# Patient Record
Sex: MALE | Race: BLACK OR AFRICAN AMERICAN | Employment: OTHER | URBAN - METROPOLITAN AREA
[De-identification: names, ages, dates, MRNs, and addresses within clinical notes are randomized per-mention and may not be internally consistent; named-entity substitution may affect disease eponyms.]

---

## 2018-07-17 ENCOUNTER — HOSPITAL ENCOUNTER (EMERGENCY)
Age: 38
Discharge: HOME | End: 2018-07-17
Attending: EMERGENCY MEDICINE
Payer: MEDICARE

## 2018-07-17 VITALS
WEIGHT: 315 LBS | TEMPERATURE: 98.7 F | OXYGEN SATURATION: 99 % | DIASTOLIC BLOOD PRESSURE: 106 MMHG | BODY MASS INDEX: 39.17 KG/M2 | HEIGHT: 75 IN | HEART RATE: 76 BPM | SYSTOLIC BLOOD PRESSURE: 151 MMHG | RESPIRATION RATE: 16 BRPM

## 2018-07-17 DIAGNOSIS — G51.0 BELL'S PALSY: Primary | ICD-10-CM

## 2018-07-17 LAB — GLUCOSE BLD-MCNC: 101 MG/DL (ref 75–110)

## 2018-07-17 PROCEDURE — 99284 EMERGENCY DEPT VISIT MOD MDM: CPT

## 2018-07-17 PROCEDURE — 6370000000 HC RX 637 (ALT 250 FOR IP): Performed by: EMERGENCY MEDICINE

## 2018-07-17 PROCEDURE — 82947 ASSAY GLUCOSE BLOOD QUANT: CPT

## 2018-07-17 RX ORDER — PREDNISONE 20 MG/1
60 TABLET ORAL ONCE
Status: COMPLETED | OUTPATIENT
Start: 2018-07-17 | End: 2018-07-17

## 2018-07-17 RX ORDER — PREDNISONE 20 MG/1
60 TABLET ORAL DAILY
Qty: 15 TABLET | Refills: 0 | Status: SHIPPED | OUTPATIENT
Start: 2018-07-17 | End: 2018-07-22

## 2018-07-17 RX ORDER — MINERAL OIL, PETROLATUM 425; 568 MG/G; MG/G
1 OINTMENT OPHTHALMIC NIGHTLY
Qty: 1 TUBE | Refills: 0 | Status: SHIPPED | OUTPATIENT
Start: 2018-07-17 | End: 2018-12-11 | Stop reason: ALTCHOICE

## 2018-07-17 RX ADMIN — PREDNISONE 60 MG: 20 TABLET ORAL at 13:06

## 2018-07-17 ASSESSMENT — ENCOUNTER SYMPTOMS
ABDOMINAL PAIN: 0
EYE REDNESS: 0
SHORTNESS OF BREATH: 0
DIARRHEA: 0
EYE DISCHARGE: 0
COUGH: 0
EYE PAIN: 0
RHINORRHEA: 0
BACK PAIN: 0
VOMITING: 0

## 2018-07-17 NOTE — ED PROVIDER NOTES
[address]  eMERGENCY dEPARTMENT eNCOUnter      Pt Name: Albino Chappell  MRN: [id number]  [place] 1980  Date of evaluation: 7/17/2018  PCP:    OLEKSANDR Euceda CNP      CHIEF COMPLAINT       Chief Complaint   Patient presents with    Facial Droop         HISTORY OF PRESENT ILLNESS      Albino Chappell is a 43 y.o. male who presents For evaluation for left sided facial changes. Patient states a little distorted yesterday with little bit I change however today he states that the left side of his face is weak injury. .  He states his mom has something similar before a few years ago and was Bell's palsy. Otherwise states he feels fine. No speech changes. No weaknesses or sensory changes. Otherwise no other complaints       REVIEW OF SYSTEMS         Review of Systems   Constitutional: Negative for chills and fever. HENT: Negative for congestion and rhinorrhea. Eyes: Negative for pain, discharge and redness. Respiratory: Negative for cough and shortness of breath. Cardiovascular: Negative for chest pain. Gastrointestinal: Negative for abdominal pain, diarrhea and vomiting. Genitourinary: Negative for dysuria and hematuria. Musculoskeletal: Negative for arthralgias, back pain, myalgias, neck pain and neck stiffness. Skin: Negative for rash. Neurological: Negative for light-headedness and headaches. Left-sided facial changes   Hematological: Does not bruise/bleed easily.           PAST MEDICAL HISTORY     Past Medical History:   Diagnosis Date    Back pain     Bladder disorder, other     Childhood asthma     H/O gastroesophageal reflux (GERD)     mild    Lumbar disc disease     Snores        SURGICAL HISTORY       Past Surgical History:   Procedure Laterality Date    CIRCUMCISION      CYSTOSCOPY      with urethral dilation    WISDOM TOOTH EXTRACTION         CURRENT MEDICATIONS       Previous Medications    GABAPENTIN (NEURONTIN) 100 MG CAPSULE There is no tenderness. Musculoskeletal: Normal range of motion. Neurological: He is alert and oriented to person, place, and time. Left-sided face and both upper and lower palsy. This is consistent with a Bell's palsy   Skin: Skin is warm and dry. Nursing note and vitals reviewed. DIFFERENTIAL DIAGNOSIS/ MDM:     History of physical most consistent with a Bell's palsy. At this time with daily symptoms patient will benefit from steroids which will be started. Otherwise instructions given including Lacri-Lube for eye protection    DIAGNOSTIC RESULTS         LABS:  Labs Reviewed   POC GLUCOSE FINGERSTICK           EMERGENCY DEPARTMENT COURSE:   Vitals:    Vitals:    07/17/18 1250   BP: (!) 160/105   Pulse: 66   Resp: 16   Temp: 98.7 °F (37.1 °C)   SpO2: 96%   Weight: (!) 320 lb (145.2 kg)   Height: 6' 3\" (1.905 m)     -------------------------  BP: (!) 160/105, Temp: 98.7 °F (37.1 °C), Pulse: 66, Resp: 16      FINAL IMPRESSION      1.  Bell's palsy          DISPOSITION/PLAN    DISPOSITION Decision To Discharge 07/17/2018 12:58:49 PM      PATIENT REFERRED TO:  OLEKSANDR German - CNP  [address] [address]    Call in 1 day        DISCHARGE MEDICATIONS:  New Prescriptions    ARTIFICIAL TEAR OINTMENT (REFRESH LACRI-LUBE) OINT OINTMENT    Apply 0.5 inches to eye nightly    PREDNISONE (DELTASONE) 20 MG TABLET    Take 3 tablets by mouth daily for 5 days       (Please note that portions of this note were completed with a voice recognition program.  Efforts were made to edit the dictations but occasionally words are mis-transcribed.)    Tree Barrera MD, DO, [facility]  Attending Emergency Physician                     Tree Barrera MD  07/17/18 [id number]

## 2018-10-07 ENCOUNTER — HOSPITAL ENCOUNTER (EMERGENCY)
Age: 38
Discharge: HOME | End: 2018-10-07
Attending: EMERGENCY MEDICINE
Payer: MEDICARE

## 2018-10-07 VITALS
BODY MASS INDEX: 40.43 KG/M2 | DIASTOLIC BLOOD PRESSURE: 116 MMHG | WEIGHT: 315 LBS | RESPIRATION RATE: 14 BRPM | HEIGHT: 74 IN | HEART RATE: 86 BPM | OXYGEN SATURATION: 96 % | SYSTOLIC BLOOD PRESSURE: 150 MMHG | TEMPERATURE: 98 F

## 2018-10-07 DIAGNOSIS — Z20.2 STD EXPOSURE: Primary | ICD-10-CM

## 2018-10-07 LAB
BILIRUBIN URINE: NEGATIVE
COLOR: YELLOW
COMMENT UA: NORMAL
GLUCOSE URINE: NEGATIVE
KETONES, URINE: NEGATIVE
LEUKOCYTE ESTERASE, URINE: NEGATIVE
NITRITE, URINE: NEGATIVE
PH UA: 7.5 (ref 5–8)
PROTEIN UA: NEGATIVE
SPECIFIC GRAVITY UA: 1.02 (ref 1–1.03)
TURBIDITY: CLEAR
URINE HGB: NEGATIVE
UROBILINOGEN, URINE: NORMAL

## 2018-10-07 PROCEDURE — 6370000000 HC RX 637 (ALT 250 FOR IP): Performed by: EMERGENCY MEDICINE

## 2018-10-07 PROCEDURE — 99283 EMERGENCY DEPT VISIT LOW MDM: CPT

## 2018-10-07 PROCEDURE — 87491 CHLMYD TRACH DNA AMP PROBE: CPT

## 2018-10-07 PROCEDURE — 96372 THER/PROPH/DIAG INJ SC/IM: CPT

## 2018-10-07 PROCEDURE — 81003 URINALYSIS AUTO W/O SCOPE: CPT

## 2018-10-07 PROCEDURE — 6360000002 HC RX W HCPCS: Performed by: EMERGENCY MEDICINE

## 2018-10-07 PROCEDURE — 87591 N.GONORRHOEAE DNA AMP PROB: CPT

## 2018-10-07 RX ORDER — CEFTRIAXONE 500 MG/1
250 INJECTION, POWDER, FOR SOLUTION INTRAMUSCULAR; INTRAVENOUS ONCE
Status: COMPLETED | OUTPATIENT
Start: 2018-10-07 | End: 2018-10-07

## 2018-10-07 RX ORDER — AZITHROMYCIN 250 MG/1
1000 TABLET, FILM COATED ORAL ONCE
Status: COMPLETED | OUTPATIENT
Start: 2018-10-07 | End: 2018-10-07

## 2018-10-07 RX ADMIN — CEFTRIAXONE SODIUM 250 MG: 500 INJECTION, POWDER, FOR SOLUTION INTRAMUSCULAR; INTRAVENOUS at 03:34

## 2018-10-07 RX ADMIN — AZITHROMYCIN 1000 MG: 250 TABLET, FILM COATED ORAL at 03:34

## 2018-10-08 LAB
C. TRACHOMATIS DNA ,URINE: NEGATIVE
N. GONORRHOEAE DNA, URINE: NEGATIVE

## 2018-12-11 ENCOUNTER — HOSPITAL ENCOUNTER (OUTPATIENT)
Age: 38
Discharge: HOME | End: 2018-12-11
Payer: MEDICARE

## 2018-12-11 ENCOUNTER — OFFICE VISIT (OUTPATIENT)
Dept: PRIMARY CARE CLINIC | Age: 38
End: 2018-12-11
Payer: MEDICARE

## 2018-12-11 VITALS
HEIGHT: 75 IN | DIASTOLIC BLOOD PRESSURE: 79 MMHG | BODY MASS INDEX: 39.17 KG/M2 | TEMPERATURE: 97.7 F | SYSTOLIC BLOOD PRESSURE: 133 MMHG | HEART RATE: 73 BPM | OXYGEN SATURATION: 99 % | WEIGHT: 315 LBS

## 2018-12-11 DIAGNOSIS — M54.50 CHRONIC MIDLINE LOW BACK PAIN WITHOUT SCIATICA: ICD-10-CM

## 2018-12-11 DIAGNOSIS — Z76.89 ENCOUNTER TO ESTABLISH CARE: ICD-10-CM

## 2018-12-11 DIAGNOSIS — E66.01 CLASS 3 SEVERE OBESITY DUE TO EXCESS CALORIES WITH BODY MASS INDEX (BMI) OF 40.0 TO 44.9 IN ADULT, UNSPECIFIED WHETHER SERIOUS COMORBIDITY PRESENT (HCC): ICD-10-CM

## 2018-12-11 DIAGNOSIS — Z13.220 SCREENING FOR HYPERLIPIDEMIA: ICD-10-CM

## 2018-12-11 DIAGNOSIS — Z79.899 MEDICATION MANAGEMENT: ICD-10-CM

## 2018-12-11 DIAGNOSIS — G89.29 CHRONIC MIDLINE LOW BACK PAIN WITHOUT SCIATICA: ICD-10-CM

## 2018-12-11 DIAGNOSIS — Z71.6 TOBACCO ABUSE COUNSELING: ICD-10-CM

## 2018-12-11 DIAGNOSIS — K59.00 CONSTIPATION, UNSPECIFIED CONSTIPATION TYPE: Primary | ICD-10-CM

## 2018-12-11 DIAGNOSIS — Z72.0 TOBACCO ABUSE: ICD-10-CM

## 2018-12-11 PROBLEM — G51.0 BELL'S PALSY: Status: ACTIVE | Noted: 2018-12-11

## 2018-12-11 LAB
ALBUMIN SERPL-MCNC: 4.4 G/DL (ref 3.5–5.2)
ALBUMIN/GLOBULIN RATIO: 1.4 (ref 1–2.5)
ALP BLD-CCNC: 48 U/L (ref 40–129)
ALT SERPL-CCNC: 26 U/L (ref 5–41)
ANION GAP SERPL CALCULATED.3IONS-SCNC: 13 MMOL/L (ref 9–17)
AST SERPL-CCNC: 27 U/L
BILIRUB SERPL-MCNC: 0.29 MG/DL (ref 0.3–1.2)
BUN BLDV-MCNC: 11 MG/DL (ref 6–20)
BUN/CREAT BLD: ABNORMAL (ref 9–20)
CALCIUM SERPL-MCNC: 9.6 MG/DL (ref 8.6–10.4)
CHLORIDE BLD-SCNC: 105 MMOL/L (ref 98–107)
CHOLESTEROL/HDL RATIO: 5.3
CHOLESTEROL: 224 MG/DL
CO2: 22 MMOL/L (ref 20–31)
CREAT SERPL-MCNC: 0.84 MG/DL (ref 0.7–1.2)
GFR AFRICAN AMERICAN: >60 ML/MIN
GFR NON-AFRICAN AMERICAN: >60 ML/MIN
GFR SERPL CREATININE-BSD FRML MDRD: ABNORMAL ML/MIN/{1.73_M2}
GFR SERPL CREATININE-BSD FRML MDRD: ABNORMAL ML/MIN/{1.73_M2}
GLUCOSE BLD-MCNC: 91 MG/DL (ref 70–99)
HCT VFR BLD CALC: 47.3 % (ref 40.7–50.3)
HDLC SERPL-MCNC: 42 MG/DL
HEMOGLOBIN: 14.9 G/DL (ref 13–17)
LDL CHOLESTEROL: 137 MG/DL (ref 0–130)
MCH RBC QN AUTO: 27.4 PG (ref 25.2–33.5)
MCHC RBC AUTO-ENTMCNC: 31.5 G/DL (ref 28.4–34.8)
MCV RBC AUTO: 86.9 FL (ref 82.6–102.9)
NRBC AUTOMATED: 0 PER 100 WBC
PDW BLD-RTO: 15.5 % (ref 11.8–14.4)
PLATELET # BLD: 277 K/UL (ref 138–453)
PMV BLD AUTO: 10.5 FL (ref 8.1–13.5)
POTASSIUM SERPL-SCNC: 4.5 MMOL/L (ref 3.7–5.3)
RBC # BLD: 5.44 M/UL (ref 4.21–5.77)
SODIUM BLD-SCNC: 140 MMOL/L (ref 135–144)
TOTAL PROTEIN: 7.5 G/DL (ref 6.4–8.3)
TRIGL SERPL-MCNC: 223 MG/DL
VLDLC SERPL CALC-MCNC: ABNORMAL MG/DL (ref 1–30)
WBC # BLD: 9.5 K/UL (ref 3.5–11.3)

## 2018-12-11 PROCEDURE — 80053 COMPREHEN METABOLIC PANEL: CPT

## 2018-12-11 PROCEDURE — G8427 DOCREV CUR MEDS BY ELIG CLIN: HCPCS | Performed by: PHYSICIAN ASSISTANT

## 2018-12-11 PROCEDURE — 99214 OFFICE O/P EST MOD 30 MIN: CPT | Performed by: PHYSICIAN ASSISTANT

## 2018-12-11 PROCEDURE — G8484 FLU IMMUNIZE NO ADMIN: HCPCS | Performed by: PHYSICIAN ASSISTANT

## 2018-12-11 PROCEDURE — 4004F PT TOBACCO SCREEN RCVD TLK: CPT | Performed by: PHYSICIAN ASSISTANT

## 2018-12-11 PROCEDURE — 36415 COLL VENOUS BLD VENIPUNCTURE: CPT

## 2018-12-11 PROCEDURE — 85027 COMPLETE CBC AUTOMATED: CPT

## 2018-12-11 PROCEDURE — G8417 CALC BMI ABV UP PARAM F/U: HCPCS | Performed by: PHYSICIAN ASSISTANT

## 2018-12-11 PROCEDURE — 80061 LIPID PANEL: CPT

## 2018-12-11 RX ORDER — DOCUSATE SODIUM 250 MG
250 CAPSULE ORAL 2 TIMES DAILY PRN
Qty: 60 CAPSULE | Refills: 1 | Status: SHIPPED | OUTPATIENT
Start: 2018-12-11 | End: 2019-08-27 | Stop reason: SDUPTHER

## 2018-12-11 ASSESSMENT — PATIENT HEALTH QUESTIONNAIRE - PHQ9
SUM OF ALL RESPONSES TO PHQ QUESTIONS 1-9: 0
2. FEELING DOWN, DEPRESSED OR HOPELESS: 0
SUM OF ALL RESPONSES TO PHQ QUESTIONS 1-9: 0
SUM OF ALL RESPONSES TO PHQ9 QUESTIONS 1 & 2: 0
1. LITTLE INTEREST OR PLEASURE IN DOING THINGS: 0

## 2018-12-11 ASSESSMENT — ENCOUNTER SYMPTOMS
HEMATEMESIS: 0
VOMITING: 0
HEMATOCHEZIA: 1
CONSTIPATION: 1
BACK PAIN: 1
NAUSEA: 0
DIARRHEA: 1

## 2018-12-11 NOTE — PROGRESS NOTES
increased risk of developing Labs reviewed, informed patient he will be sent for lab work and have completed before follow-up appointment. Health maintenance reviewed. Medications reviewed, prescribed Colace 250 mg twice a day when necessary. GI Problem    The primary symptoms include diarrhea (3 BM daily), hematochezia and myalgias. Primary symptoms do not include fever, abdominal pain, nausea, vomiting, hematemesis, dysuria or rash. The diarrhea began more than 1 week ago. Daily occurrences: few times weekly. The hematochezia began more than 1 week ago. The hematochezia is a recurrent problem. The illness is also significant for constipation and back pain. The illness does not include chills. ______________________________________________________________________  Past Medical/Surgical History:        Diagnosis Date    Back pain     Bladder disorder, other     Childhood asthma     H/O gastroesophageal reflux (GERD)     mild    Lumbar disc disease     Snores            Procedure Laterality Date    CIRCUMCISION      CYSTOSCOPY      with urethral dilation    WISDOM TOOTH EXTRACTION         ______________________________________________________________________  There are no preventive care reminders to display for this patient. No Known Allergies    Current Outpatient Prescriptions   Medication Sig Dispense Refill    docusate sodium (COLACE) 250 MG capsule Take 1 capsule by mouth 2 times daily as needed for Constipation 60 capsule 1     No current facility-administered medications for this visit.         Social History   Substance Use Topics    Smoking status: Current Every Day Smoker     Packs/day: 0.25     Years: 18.00     Types: Cigarettes    Smokeless tobacco: Never Used      Comment: 4 cigs daily as of 12/11/18    Alcohol use 0.0 oz/week      Comment: occasionally       Family History   Problem Relation Age of Onset    Heart Disease Mother     Arthritis Mother     Diabetes Mother tenderness and bony tenderness. Neurological: He is alert and oriented to person, place, and time. Skin: Skin is warm and dry. Psychiatric: Judgment normal.   Vitals reviewed. ______________________________________________________________________  Diagnostic findings:  CBC:  Lab Results   Component Value Date    WBC 9.5 12/11/2018    HGB 14.9 12/11/2018     12/11/2018       BMP:    Lab Results   Component Value Date     12/11/2018    K 4.5 12/11/2018     12/11/2018    CO2 22 12/11/2018    BUN 11 12/11/2018    CREATININE 0.84 12/11/2018    GLUCOSE 91 12/11/2018       HEMOGLOBIN A1C: No results found for: LABA1C    FASTING LIPID PANEL:  Lab Results   Component Value Date    CHOL 224 (H) 12/11/2018    HDL 42 12/11/2018    TRIG 223 (H) 12/11/2018     ______________________________________________________________________  Assessment andPlan:  Doroteo Hazel was seen today for establish care. Diagnoses and all orders for this visit:    Constipation, unspecified constipation type  -     docusate sodium (COLACE) 250 MG capsule; Take 1 capsule by mouth 2 times daily as needed for Constipation    Chronic midline low back pain without sciatica  -     [facility]    Tobacco abuse    Tobacco abuse counseling    Class 3 severe obesity due to excess calories with body mass index (BMI) of 40.0 to 44.9 in adult, unspecified whether serious comorbidity present ([address]    Screening for hyperlipidemia  -     Lipid Panel; Future    Encounter to establish care  -     CBC; Future  -     Comprehensive Metabolic Panel; Future    Other orders  -     Cancel: HIV Screen; Future        ______________________________________________________________________  Follow up and instructions:  Return in about 6 weeks (around 1/22/2019) for Obesity, Physical, Lab Review, Back Pain, Tobacco Use.     · Doroteo Hazel received counseling on the followinghealthy behaviors: nutrition and exercise, tobacco

## 2018-12-11 NOTE — PATIENT INSTRUCTIONS
losing weight if you keep track of what you eat and what you do. Follow-up care is a key part of your treatment and safety. Be sure to make and go to all appointments, and call your doctor if you are having problems. It's also a good idea to know your test results and keep a list of the medicines you take. Where can you learn more? Go to https://chpedaphneyeweb.[email or web address] [email or web address] org and sign in to your Rentify account. Enter A121 in the [employer] box to learn more about \"Learning About Low-Carbohydrate Diets for Weight Loss. \"     If you do not have an account, please click on the \"Sign Up Now\" link. Current as of: March 29, 2018  Content Version: 11.8  © 0606-3098 Healthwise, Incorporated. Care instructions adapted under license by [address] If you have questions about a medical condition or this instruction, always ask your healthcare professional. [address] any warranty or liability for your use of this information.

## 2018-12-12 NOTE — PROGRESS NOTES
Elevated cholesterol, ret of labs normal  Will discuss results with pt,  Call and scheduled appt if pt is not already scheduled in the next month

## 2018-12-15 PROBLEM — E66.01 CLASS 3 SEVERE OBESITY DUE TO EXCESS CALORIES WITH BODY MASS INDEX (BMI) OF 40.0 TO 44.9 IN ADULT (HCC): Status: ACTIVE | Noted: 2018-12-15

## 2018-12-15 PROBLEM — Z72.0 TOBACCO ABUSE: Status: ACTIVE | Noted: 2018-12-15

## 2018-12-15 PROBLEM — Z71.6 TOBACCO ABUSE COUNSELING: Status: ACTIVE | Noted: 2018-12-15

## 2018-12-15 PROBLEM — E66.813 CLASS 3 SEVERE OBESITY DUE TO EXCESS CALORIES WITH BODY MASS INDEX (BMI) OF 40.0 TO 44.9 IN ADULT: Status: ACTIVE | Noted: 2018-12-15

## 2018-12-15 ASSESSMENT — ENCOUNTER SYMPTOMS
ABDOMINAL PAIN: 0
SORE THROAT: 0
SHORTNESS OF BREATH: 0
WHEEZING: 0
COUGH: 0

## 2018-12-16 ENCOUNTER — HOSPITAL ENCOUNTER (EMERGENCY)
Age: 38
Discharge: HOME | End: 2018-12-16
Attending: EMERGENCY MEDICINE
Payer: MEDICARE

## 2018-12-16 ENCOUNTER — APPOINTMENT (OUTPATIENT)
Dept: GENERAL RADIOLOGY | Age: 38
End: 2018-12-16
Payer: MEDICARE

## 2018-12-16 VITALS
HEIGHT: 75 IN | TEMPERATURE: 98.2 F | WEIGHT: 315 LBS | SYSTOLIC BLOOD PRESSURE: 145 MMHG | BODY MASS INDEX: 39.17 KG/M2 | RESPIRATION RATE: 16 BRPM | OXYGEN SATURATION: 97 % | HEART RATE: 88 BPM | DIASTOLIC BLOOD PRESSURE: 86 MMHG

## 2018-12-16 DIAGNOSIS — S51.812A LACERATION OF LEFT FOREARM, INITIAL ENCOUNTER: ICD-10-CM

## 2018-12-16 DIAGNOSIS — S51.812A STAB WOUND OF LEFT FOREARM, INITIAL ENCOUNTER: Primary | ICD-10-CM

## 2018-12-16 PROCEDURE — 12001 RPR S/N/AX/GEN/TRNK 2.5CM/<: CPT

## 2018-12-16 PROCEDURE — 73090 X-RAY EXAM OF FOREARM: CPT

## 2018-12-16 PROCEDURE — 2500000003 HC RX 250 WO HCPCS: Performed by: EMERGENCY MEDICINE

## 2018-12-16 PROCEDURE — 90471 IMMUNIZATION ADMIN: CPT | Performed by: EMERGENCY MEDICINE

## 2018-12-16 PROCEDURE — 6360000002 HC RX W HCPCS: Performed by: EMERGENCY MEDICINE

## 2018-12-16 PROCEDURE — 99282 EMERGENCY DEPT VISIT SF MDM: CPT

## 2018-12-16 PROCEDURE — 90715 TDAP VACCINE 7 YRS/> IM: CPT | Performed by: EMERGENCY MEDICINE

## 2018-12-16 RX ORDER — LIDOCAINE HYDROCHLORIDE 10 MG/ML
20 INJECTION, SOLUTION INFILTRATION; PERINEURAL ONCE
Status: COMPLETED | OUTPATIENT
Start: 2018-12-16 | End: 2018-12-16

## 2018-12-16 RX ADMIN — TETANUS TOXOID, REDUCED DIPHTHERIA TOXOID AND ACELLULAR PERTUSSIS VACCINE, ADSORBED 0.5 ML: 5; 2.5; 8; 8; 2.5 SUSPENSION INTRAMUSCULAR at 04:16

## 2018-12-16 RX ADMIN — LIDOCAINE HYDROCHLORIDE 20 ML: 10 INJECTION, SOLUTION INFILTRATION; PERINEURAL at 04:19

## 2018-12-16 ASSESSMENT — PAIN DESCRIPTION - PAIN TYPE: TYPE: ACUTE PAIN

## 2018-12-16 ASSESSMENT — PAIN SCALES - GENERAL
PAINLEVEL_OUTOF10: 8
PAINLEVEL_OUTOF10: 8

## 2018-12-16 ASSESSMENT — PAIN DESCRIPTION - ORIENTATION: ORIENTATION: LEFT

## 2018-12-16 ASSESSMENT — PAIN DESCRIPTION - LOCATION: LOCATION: ARM

## 2018-12-16 ASSESSMENT — PAIN DESCRIPTION - DESCRIPTORS: DESCRIPTORS: ACHING;CONSTANT

## 2018-12-16 NOTE — ED PROVIDER NOTES
Prescriptions    No medications on file         Denise Krueger MD  Attending Emergency Physician                      Denise Krueger MD  12/16/18 [id number]

## 2019-01-22 ENCOUNTER — TELEPHONE (OUTPATIENT)
Dept: PRIMARY CARE CLINIC | Age: 39
End: 2019-01-22

## 2019-06-14 ENCOUNTER — APPOINTMENT (OUTPATIENT)
Dept: CT IMAGING | Age: 39
End: 2019-06-14
Payer: OTHER MISCELLANEOUS

## 2019-06-14 ENCOUNTER — HOSPITAL ENCOUNTER (EMERGENCY)
Age: 39
Discharge: HOME | End: 2019-06-14
Attending: EMERGENCY MEDICINE
Payer: OTHER MISCELLANEOUS

## 2019-06-14 VITALS
BODY MASS INDEX: 39.17 KG/M2 | WEIGHT: 315 LBS | SYSTOLIC BLOOD PRESSURE: 156 MMHG | HEART RATE: 75 BPM | TEMPERATURE: 98.2 F | OXYGEN SATURATION: 97 % | HEIGHT: 75 IN | RESPIRATION RATE: 16 BRPM | DIASTOLIC BLOOD PRESSURE: 106 MMHG

## 2019-06-14 DIAGNOSIS — S16.1XXA ACUTE STRAIN OF NECK MUSCLE, INITIAL ENCOUNTER: Primary | ICD-10-CM

## 2019-06-14 DIAGNOSIS — S39.012A STRAIN OF LUMBAR REGION, INITIAL ENCOUNTER: ICD-10-CM
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
## 2019-06-14 PROCEDURE — 6370000000 HC RX 637 (ALT 250 FOR IP): Performed by: EMERGENCY MEDICINE

## 2019-06-14 PROCEDURE — 72125 CT NECK SPINE W/O DYE: CPT

## 2019-06-14 PROCEDURE — 72131 CT LUMBAR SPINE W/O DYE: CPT

## 2019-06-14 PROCEDURE — 99285 EMERGENCY DEPT VISIT HI MDM: CPT

## 2019-06-14 RX ORDER — CYCLOBENZAPRINE HCL 10 MG
10 TABLET ORAL ONCE
Status: COMPLETED | OUTPATIENT
Start: 2019-06-14 | End: 2019-06-14

## 2019-06-14 RX ORDER — LIDOCAINE 4 G/G
1 PATCH TOPICAL DAILY
Status: DISCONTINUED | OUTPATIENT
Start: 2019-06-14 | End: 2019-06-14 | Stop reason: HOSPADM

## 2019-06-14 RX ORDER — LIDOCAINE 4 G/G
1 PATCH TOPICAL DAILY
Qty: 5 PATCH | Refills: 0 | Status: SHIPPED | OUTPATIENT
Start: 2019-06-14 | End: 2019-08-27 | Stop reason: SDUPTHER

## 2019-06-14 RX ORDER — CYCLOBENZAPRINE HCL 10 MG
10 TABLET ORAL 3 TIMES DAILY PRN
Qty: 20 TABLET | Refills: 0 | Status: SHIPPED | OUTPATIENT
Start: 2019-06-14 | End: 2019-06-24

## 2019-06-14 RX ORDER — IBUPROFEN 600 MG/1
600 TABLET ORAL EVERY 6 HOURS PRN
Qty: 40 TABLET | Refills: 0 | Status: SHIPPED | OUTPATIENT
Start: 2019-06-14

## 2019-06-14 RX ORDER — HYDROCODONE BITARTRATE AND ACETAMINOPHEN 5; 325 MG/1; MG/1
1 TABLET ORAL ONCE
Status: COMPLETED | OUTPATIENT
Start: 2019-06-14 | End: 2019-06-14

## 2019-06-14 RX ADMIN — HYDROCODONE BITARTRATE AND ACETAMINOPHEN 1 TABLET: 5; 325 TABLET ORAL at 16:58

## 2019-06-14 RX ADMIN — CYCLOBENZAPRINE HYDROCHLORIDE 10 MG: 10 TABLET, FILM COATED ORAL at 16:58

## 2019-06-14 ASSESSMENT — ENCOUNTER SYMPTOMS
VOMITING: 0
CONTUSION: 0
SHORTNESS OF BREATH: 0
ABDOMINAL PAIN: 0
BACK PAIN: 1
NAUSEA: 0

## 2019-06-14 ASSESSMENT — PAIN SCALES - GENERAL
PAINLEVEL_OUTOF10: 8
PAINLEVEL_OUTOF10: 8
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
## 2019-06-21 ENCOUNTER — OFFICE VISIT (OUTPATIENT)
Dept: PRIMARY CARE CLINIC | Age: 39
End: 2019-06-21
Payer: MEDICARE

## 2019-06-21 VITALS
WEIGHT: 315 LBS | OXYGEN SATURATION: 95 % | BODY MASS INDEX: 40.5 KG/M2 | TEMPERATURE: 97.9 F | SYSTOLIC BLOOD PRESSURE: 147 MMHG | DIASTOLIC BLOOD PRESSURE: 96 MMHG | HEART RATE: 76 BPM

## 2019-06-21 DIAGNOSIS — M54.2 ACUTE NECK PAIN: Primary | ICD-10-CM

## 2019-06-21 DIAGNOSIS — M54.5 ACUTE LOW BACK PAIN, UNSPECIFIED BACK PAIN LATERALITY, WITH SCIATICA PRESENCE UNSPECIFIED: ICD-10-CM

## 2019-06-21 PROCEDURE — G8417 CALC BMI ABV UP PARAM F/U: HCPCS | Performed by: NURSE PRACTITIONER

## 2019-06-21 PROCEDURE — G8427 DOCREV CUR MEDS BY ELIG CLIN: HCPCS | Performed by: NURSE PRACTITIONER

## 2019-06-21 PROCEDURE — 99202 OFFICE O/P NEW SF 15 MIN: CPT | Performed by: NURSE PRACTITIONER

## 2019-06-21 PROCEDURE — 96160 PT-FOCUSED HLTH RISK ASSMT: CPT | Performed by: NURSE PRACTITIONER

## 2019-06-21 PROCEDURE — 4004F PT TOBACCO SCREEN RCVD TLK: CPT | Performed by: NURSE PRACTITIONER

## 2019-06-21 ASSESSMENT — PATIENT HEALTH QUESTIONNAIRE - PHQ9
6. FEELING BAD ABOUT YOURSELF - OR THAT YOU ARE A FAILURE OR HAVE LET YOURSELF OR YOUR FAMILY DOWN: 1
4. FEELING TIRED OR HAVING LITTLE ENERGY: 1
7. TROUBLE CONCENTRATING ON THINGS, SUCH AS READING THE NEWSPAPER OR WATCHING TELEVISION: 1
1. LITTLE INTEREST OR PLEASURE IN DOING THINGS: 3
8. MOVING OR SPEAKING SO SLOWLY THAT OTHER PEOPLE COULD HAVE NOTICED. OR THE OPPOSITE, BEING SO FIGETY OR RESTLESS THAT YOU HAVE BEEN MOVING AROUND A LOT MORE THAN USUAL: 1
3. TROUBLE FALLING OR STAYING ASLEEP: 1
SUM OF ALL RESPONSES TO PHQ QUESTIONS 1-9: 13
5. POOR APPETITE OR OVEREATING: 1
2. FEELING DOWN, DEPRESSED OR HOPELESS: 3
10. IF YOU CHECKED OFF ANY PROBLEMS, HOW DIFFICULT HAVE THESE PROBLEMS MADE IT FOR YOU TO DO YOUR WORK, TAKE CARE OF THINGS AT HOME, OR GET ALONG WITH OTHER PEOPLE: 2
9. THOUGHTS THAT YOU WOULD BE BETTER OFF DEAD, OR OF HURTING YOURSELF: 1
SUM OF ALL RESPONSES TO PHQ9 QUESTIONS 1 & 2: 6
SUM OF ALL RESPONSES TO PHQ QUESTIONS 1-9: 13

## 2019-06-21 ASSESSMENT — ENCOUNTER SYMPTOMS
EYE PAIN: 0
DIARRHEA: 0
NAUSEA: 0
SORE THROAT: 0
SINUS PAIN: 0
ABDOMINAL PAIN: 0
SHORTNESS OF BREATH: 0
VOMITING: 0
COUGH: 0
BACK PAIN: 1

## 2019-06-21 NOTE — PATIENT INSTRUCTIONS
Patient Education        Back Pain: Care Instructions  Your Care Instructions    Back pain has many possible causes. It is often related to problems with muscles and ligaments of the back. It may also be related to problems with the nerves, discs, or bones of the back. Moving, lifting, standing, sitting, or sleeping in an awkward way can strain the back. Sometimes you don't notice the injury until later. Arthritis is another common cause of back pain. Although it may hurt a lot, back pain usually improves on its own within several weeks. Most people recover in 12 weeks or less. Using good home treatment and being careful not to stress your back can help you feel better sooner. Follow-up care is a key part of your treatment and safety. Be sure to make and go to all appointments, and call your doctor if you are having problems. It's also a good idea to know your test results and keep a list of the medicines you take. How can you care for yourself at home? · Sit or lie in positions that are most comfortable and reduce your pain. Try one of these positions when you lie down:  ? Lie on your back with your knees bent and supported by large pillows. ? Lie on the floor with your legs on the seat of a sofa or chair. ? Lie on your side with your knees and hips bent and a pillow between your legs. ? Lie on your stomach if it does not make pain worse. · Do not sit up in bed, and avoid soft couches and twisted positions. Bed rest can help relieve pain at first, but it delays healing. Avoid bed rest after the first day of back pain. · Change positions every 30 minutes. If you must sit for long periods of time, take breaks from sitting. Get up and walk around, or lie in a comfortable position. · Try using a heating pad on a low or medium setting for 15 to 20 minutes every 2 or 3 hours. Try a warm shower in place of one session with the heating pad.   · You can also try an ice pack for 10 to 15 minutes every 2 to 3 hours. Put a thin cloth between the ice pack and your skin. · Take pain medicines exactly as directed. ? If the doctor gave you a prescription medicine for pain, take it as prescribed. ? If you are not taking a prescription pain medicine, ask your doctor if you can take an over-the-counter medicine. · Take short walks several times a day. You can start with 5 to 10 minutes, 3 or 4 times a day, and work up to longer walks. Walk on level surfaces and avoid hills and stairs until your back is better. · Return to work and other activities as soon as you can. Continued rest without activity is usually not good for your back. · To prevent future back pain, do exercises to stretch and strengthen your back and stomach. Learn how to use good posture, safe lifting techniques, and proper body mechanics. When should you call for help? Call your doctor now or seek immediate medical care if:    · You have new or worsening numbness in your legs.     · You have new or worsening weakness in your legs. (This could make it hard to stand up.)     · You lose control of your bladder or bowels.    Watch closely for changes in your health, and be sure to contact your doctor if:    · You have a fever, lose weight, or don't feel well.     · You do not get better as expected. Where can you learn more? Go to https://Firefly BioWorks.Bandwidth. org and sign in to your Seeq account. Enter A899 in the [facility] box to learn more about \"Back Pain: Care Instructions. \"     If you do not have an account, please click on the \"Sign Up Now\" link. Current as of: September 20, 2018  Content Version: 12.0  © 0754-2150 Healthwise, Incorporated. Care instructions adapted under license by [facility] ([facility]). If you have questions about a medical condition or this instruction, always ask your healthcare professional. [address] any warranty or liability for your use of this information.          Patient Education Neck Pain: Care Instructions  Your Care Instructions    You can have neck pain anywhere from the bottom of your head to the top of your shoulders. It can spread to the upper back or arms. Injuries, painting a ceiling, sleeping with your neck twisted, staying in one position for too long, and many other activities can cause neck pain. Most neck pain gets better with home care. Your doctor may recommend medicine to relieve pain or relax your muscles. He or she may suggest exercise and physical therapy to increase flexibility and relieve stress. You may need to wear a special (cervical) collar to support your neck for a day or two. Follow-up care is a key part of your treatment and safety. Be sure to make and go to all appointments, and call your doctor if you are having problems. It's also a good idea to know your test results and keep a list of the medicines you take. How can you care for yourself at home? · Try using a heating pad on a low or medium setting for 15 to 20 minutes every 2 or 3 hours. Try a warm shower in place of one session with the heating pad. · You can also try an ice pack for 10 to 15 minutes every 2 to 3 hours. Put a thin cloth between the ice and your skin. · Take pain medicines exactly as directed. ? If the doctor gave you a prescription medicine for pain, take it as prescribed. ? If you are not taking a prescription pain medicine, ask your doctor if you can take an over-the-counter medicine. · If your doctor recommends a cervical collar, wear it exactly as directed. When should you call for help? Call your doctor now or seek immediate medical care if:    · You have new or worsening numbness in your arms, buttocks or legs.     · You have new or worsening weakness in your arms or legs.  (This could make it hard to stand up.)     · You lose control of your bladder or bowels.    Watch closely for changes in your health, and be sure to contact your doctor if:    · Your neck pain is getting worse.     · You are not getting better after 1 week.     · You do not get better as expected. Where can you learn more? Go to [email or web address]://[email or web address].[email or web address]. org and sign in to your VoxFeed account. Enter [id number] in the [facility] box to learn more about \"Neck Pain: Care Instructions. \"     If you do not have an account, please click on the \"Sign Up Now\" link. Current as of: September 20, 2018  Content Version: 12.0  © 1560-0868 Healthwise, Incorporated. Care instructions adapted under license by [facility] ([facility]). If you have questions about a medical condition or this instruction, always ask your healthcare professional. [address] any warranty or liability for your use of this information.
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
## 2019-06-21 NOTE — PROGRESS NOTES
Visit Information    Have you changed or started any medications since your last visit including any over-the-counter medicines, vitamins, or herbal medicines? no   Are you having any side effects from any of your medications? -  no  Have you stopped taking any of your medications? Is so, why? -  no    Have you seen any other physician or provider since your last visit? No  Have you had any other diagnostic tests since your last visit? Yes - Records Requested  Have you been seen in the emergency room and/or had an admission to a hospital since we last saw you? Yes - Records Requested  Have you had your routine dental cleaning in the past 6 months? no    Have you activated your [employer] account? If not, what are your barriers?  Yes     Patient Care Team:  Ailyn Mills MD (Pain Management)  Gloria Macdonald MD as Consulting Physician (Urology)  Rinku Martinez DO as Consulting Physician (Pain Management)  Robin Salazar (Inactive) (Pain Management)    Medical History Review  Past Medical, Family, and Social History reviewed and does not contribute to the patient presenting condition    Health Maintenance   Topic Date Due    Pneumococcal 0-64 years Vaccine (1 of 1 - PPSV23) 05/04/1985    HIV screen  05/04/1994    Flu vaccine (Season Ended) 09/01/2019    Lipid screen  12/11/2023    DTaP/Tdap/Td vaccine (2 - Td) 12/16/2028

## 2019-06-22 ENCOUNTER — HOSPITAL ENCOUNTER (OUTPATIENT)
Dept: MRI IMAGING | Age: 39
Discharge: HOME | End: 2019-06-24
Payer: MEDICARE

## 2019-06-22 DIAGNOSIS — M54.2 ACUTE NECK PAIN: ICD-10-CM

## 2019-06-22 DIAGNOSIS — M54.5 ACUTE LOW BACK PAIN, UNSPECIFIED BACK PAIN LATERALITY, WITH SCIATICA PRESENCE UNSPECIFIED: ICD-10-CM

## 2019-06-22 PROCEDURE — 72158 MRI LUMBAR SPINE W/O & W/DYE: CPT

## 2019-06-22 PROCEDURE — A9579 GAD-BASE MR CONTRAST NOS,1ML: HCPCS | Performed by: NURSE PRACTITIONER

## 2019-06-22 PROCEDURE — 72156 MRI NECK SPINE W/O & W/DYE: CPT

## 2019-06-22 PROCEDURE — 6360000004 HC RX CONTRAST MEDICATION: Performed by: NURSE PRACTITIONER

## 2019-06-22 RX ADMIN — GADOTERIDOL 20 ML: 279.3 INJECTION, SOLUTION INTRAVENOUS at 09:16

## 2019-06-24 ENCOUNTER — OFFICE VISIT (OUTPATIENT)
Dept: PRIMARY CARE CLINIC | Age: 39
End: 2019-06-24
Payer: MEDICARE

## 2019-06-24 VITALS
DIASTOLIC BLOOD PRESSURE: 105 MMHG | WEIGHT: 315 LBS | HEART RATE: 74 BPM | TEMPERATURE: 97.3 F | BODY MASS INDEX: 40.72 KG/M2 | SYSTOLIC BLOOD PRESSURE: 151 MMHG

## 2019-06-24 DIAGNOSIS — M54.2 NECK PAIN: ICD-10-CM

## 2019-06-24 DIAGNOSIS — M54.50 LUMBAR BACK PAIN: Primary | ICD-10-CM

## 2019-06-24 PROCEDURE — 4004F PT TOBACCO SCREEN RCVD TLK: CPT | Performed by: INTERNAL MEDICINE

## 2019-06-24 PROCEDURE — 99213 OFFICE O/P EST LOW 20 MIN: CPT | Performed by: INTERNAL MEDICINE

## 2019-06-24 PROCEDURE — G8427 DOCREV CUR MEDS BY ELIG CLIN: HCPCS | Performed by: INTERNAL MEDICINE

## 2019-06-24 PROCEDURE — G8417 CALC BMI ABV UP PARAM F/U: HCPCS | Performed by: INTERNAL MEDICINE

## 2019-06-24 RX ORDER — CYCLOBENZAPRINE HCL 10 MG
10 TABLET ORAL 3 TIMES DAILY PRN
Qty: 30 TABLET | Refills: 0 | Status: SHIPPED | OUTPATIENT
Start: 2019-06-24 | End: 2019-07-04

## 2019-06-24 ASSESSMENT — ENCOUNTER SYMPTOMS: BACK PAIN: 1

## 2019-06-24 NOTE — PROGRESS NOTES
Visit Information    Have you changed or started any medications since your last visit including any over-the-counter medicines, vitamins, or herbal medicines? no   Are you having any side effects from any of your medications? -  no  Have you stopped taking any of your medications? Is so, why? -  yes -     Have you seen any other physician or provider since your last visit? No  Have you had any other diagnostic tests since your last visit? Yes - Records Obtained  Have you been seen in the emergency room and/or had an admission to a hospital since we last saw you? No  Have you had your routine dental cleaning in the past 6 months? no    Have you activated your [employer] account? If not, what are your barriers?  Yes     Patient Care Team:  Leslie Reynoso MD (Pain Management)  Patrice Iglesias MD as Consulting Physician (Urology)  Luther Louie DO as Consulting Physician (Pain Management)  Mickiel Mealing (Inactive) (Pain Management)    Medical History Review  Past Medical, Family, and Social History reviewed and does contribute to the patient presenting condition    Health Maintenance   Topic Date Due    Pneumococcal 0-64 years Vaccine (1 of 1 - PPSV23) 05/04/1985    HIV screen  05/04/1994    Flu vaccine (Season Ended) 09/01/2019    Lipid screen  12/11/2023    DTaP/Tdap/Td vaccine (2 - Td) 12/16/2028
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
## 2019-07-01 ENCOUNTER — OFFICE VISIT (OUTPATIENT)
Dept: PRIMARY CARE CLINIC | Age: 39
End: 2019-07-01
Payer: MEDICARE

## 2019-07-01 VITALS
BODY MASS INDEX: 39.17 KG/M2 | HEIGHT: 75 IN | OXYGEN SATURATION: 99 % | TEMPERATURE: 97.3 F | SYSTOLIC BLOOD PRESSURE: 156 MMHG | DIASTOLIC BLOOD PRESSURE: 110 MMHG | RESPIRATION RATE: 20 BRPM | WEIGHT: 315 LBS | HEART RATE: 68 BPM
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
## 2019-07-01 DIAGNOSIS — M54.50 LUMBAR BACK PAIN: ICD-10-CM

## 2019-07-01 DIAGNOSIS — E66.01 CLASS 3 SEVERE OBESITY DUE TO EXCESS CALORIES WITH BODY MASS INDEX (BMI) OF 40.0 TO 44.9 IN ADULT, UNSPECIFIED WHETHER SERIOUS COMORBIDITY PRESENT (HCC): ICD-10-CM

## 2019-07-01 DIAGNOSIS — I10 HYPERTENSION, UNSPECIFIED TYPE: Primary | ICD-10-CM

## 2019-07-01 PROCEDURE — G8427 DOCREV CUR MEDS BY ELIG CLIN: HCPCS | Performed by: PHYSICIAN ASSISTANT

## 2019-07-01 PROCEDURE — 4004F PT TOBACCO SCREEN RCVD TLK: CPT | Performed by: PHYSICIAN ASSISTANT

## 2019-07-01 PROCEDURE — 99214 OFFICE O/P EST MOD 30 MIN: CPT | Performed by: PHYSICIAN ASSISTANT

## 2019-07-01 PROCEDURE — G8417 CALC BMI ABV UP PARAM F/U: HCPCS | Performed by: PHYSICIAN ASSISTANT

## 2019-07-01 RX ORDER — TIZANIDINE 4 MG/1
4 TABLET ORAL 2 TIMES DAILY
Qty: 30 TABLET | Refills: 0 | Status: SHIPPED | OUTPATIENT
Start: 2019-07-01 | End: 2019-07-16

## 2019-07-01 RX ORDER — METOPROLOL SUCCINATE 25 MG/1
25 TABLET, EXTENDED RELEASE ORAL DAILY
Qty: 30 TABLET | Refills: 1 | Status: SHIPPED | OUTPATIENT
Start: 2019-07-01 | End: 2019-07-25 | Stop reason: SINTOL

## 2019-07-01 RX ORDER — MELOXICAM 7.5 MG/1
7.5 TABLET ORAL 2 TIMES DAILY
Qty: 30 TABLET | Refills: 0 | Status: SHIPPED | OUTPATIENT
Start: 2019-07-01 | End: 2019-07-01

## 2019-07-01 ASSESSMENT — ENCOUNTER SYMPTOMS: BACK PAIN: 1
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
## 2019-07-05 ENCOUNTER — TELEPHONE (OUTPATIENT)
Dept: PRIMARY CARE CLINIC | Age: 39
End: 2019-07-05

## 2019-07-05 DIAGNOSIS — I10 HYPERTENSION, UNSPECIFIED TYPE: Primary | ICD-10-CM

## 2019-07-08 ENCOUNTER — TELEPHONE (OUTPATIENT)
Dept: PRIMARY CARE CLINIC | Age: 39
End: 2019-07-08

## 2019-07-08 DIAGNOSIS — I10 HYPERTENSION, UNSPECIFIED TYPE: ICD-10-CM

## 2019-07-08 RX ORDER — MEDICAL SUPPLY, MISCELLANEOUS
EACH MISCELLANEOUS
Qty: 1 EACH | Refills: 0 | Status: SHIPPED | OUTPATIENT
Start: 2019-07-08 | End: 2020-07-07

## 2019-07-08 RX ORDER — MEDICAL SUPPLY, MISCELLANEOUS
EACH MISCELLANEOUS
Qty: 1 EACH | Refills: 0 | Status: SHIPPED | OUTPATIENT
Start: 2019-07-08 | End: 2019-07-08 | Stop reason: SDUPTHER

## 2019-07-10 ENCOUNTER — TELEPHONE (OUTPATIENT)
Dept: PRIMARY CARE CLINIC | Age: 39
End: 2019-07-10

## 2019-07-10 DIAGNOSIS — G89.29 CHRONIC MIDLINE LOW BACK PAIN WITHOUT SCIATICA: Primary | ICD-10-CM

## 2019-07-10 DIAGNOSIS — M54.50 CHRONIC MIDLINE LOW BACK PAIN WITHOUT SCIATICA: Primary | ICD-10-CM

## 2019-07-10 NOTE — TELEPHONE ENCOUNTER
Pt called and stated that the PM that he was referred to is currently not excepting pt's with Medicaid. Pt states that he would like to know if he can be referred to another PM office outside of Dr Kelvin Coles.

## 2019-07-11 ENCOUNTER — TELEPHONE (OUTPATIENT)
Dept: PRIMARY CARE CLINIC | Age: 39
End: 2019-07-11

## 2019-07-14 ASSESSMENT — ENCOUNTER SYMPTOMS
NAUSEA: 0
VOMITING: 0
SHORTNESS OF BREATH: 0
CONSTIPATION: 0
WHEEZING: 0
COUGH: 0
DIARRHEA: 0
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
## 2019-07-25 ENCOUNTER — OFFICE VISIT (OUTPATIENT)
Dept: PRIMARY CARE CLINIC | Age: 39
End: 2019-07-25
Payer: MEDICARE

## 2019-07-25 VITALS
DIASTOLIC BLOOD PRESSURE: 95 MMHG | BODY MASS INDEX: 39.17 KG/M2 | SYSTOLIC BLOOD PRESSURE: 131 MMHG | HEIGHT: 75 IN | OXYGEN SATURATION: 99 % | WEIGHT: 315 LBS | RESPIRATION RATE: 20 BRPM | HEART RATE: 78 BPM | TEMPERATURE: 97.7 F

## 2019-07-25 DIAGNOSIS — M54.50 LUMBAR BACK PAIN: ICD-10-CM

## 2019-07-25 DIAGNOSIS — E66.01 CLASS 3 SEVERE OBESITY DUE TO EXCESS CALORIES WITH BODY MASS INDEX (BMI) OF 40.0 TO 44.9 IN ADULT, UNSPECIFIED WHETHER SERIOUS COMORBIDITY PRESENT (HCC): ICD-10-CM

## 2019-07-25 DIAGNOSIS — I10 ESSENTIAL HYPERTENSION: Primary | ICD-10-CM
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
## 2019-07-25 PROCEDURE — G8417 CALC BMI ABV UP PARAM F/U: HCPCS | Performed by: PHYSICIAN ASSISTANT

## 2019-07-25 PROCEDURE — G8427 DOCREV CUR MEDS BY ELIG CLIN: HCPCS | Performed by: PHYSICIAN ASSISTANT

## 2019-07-25 PROCEDURE — 99214 OFFICE O/P EST MOD 30 MIN: CPT | Performed by: PHYSICIAN ASSISTANT

## 2019-07-25 PROCEDURE — 4004F PT TOBACCO SCREEN RCVD TLK: CPT | Performed by: PHYSICIAN ASSISTANT

## 2019-07-25 RX ORDER — PHENTERMINE HYDROCHLORIDE 37.5 MG/1
37.5 TABLET ORAL
Qty: 30 TABLET | Refills: 0 | Status: SHIPPED | OUTPATIENT
Start: 2019-07-25 | End: 2019-08-27 | Stop reason: SDUPTHER

## 2019-07-25 RX ORDER — LISINOPRIL 20 MG/1
20 TABLET ORAL DAILY
Qty: 30 TABLET | Refills: 1 | Status: SHIPPED | OUTPATIENT
Start: 2019-07-25 | End: 2019-08-27 | Stop reason: SDUPTHER

## 2019-07-25 ASSESSMENT — ENCOUNTER SYMPTOMS: BACK PAIN: 1

## 2019-07-25 NOTE — PROGRESS NOTES
Visit Information    Have you changed or started any medications since your last visit including any over-the-counter medicines, vitamins, or herbal medicines? no   Have you stopped taking any of your medications? Is so, why? -  no  Are you having any side effects from any of your medications? - no    Have you seen any other physician or provider since your last visit?  no   Have you had any other diagnostic tests since your last visit?  no   Have you been seen in the emergency room and/or had an admission in a hospital since we last saw you?  no   Have you had your routine dental cleaning in the past 6 months?  no     Do you have an active MyChart account? If no, what is the barrier?   Yes    Patient Care Team:  Mary Ann Armas PA-C as PCP - General (Physician Assistant)  Mary Ann Armas PA-C as PCP [facility]  Jairo Hampton MD (Pain Management)  Urmila Alfred MD as Consulting Physician (Urology)  Svetlana Valle DO as Consulting Physician (Pain Management)  Clement De Jesus (Inactive) (Pain Management)    Medical History Review  Past Medical, Family, and Social History reviewed and does not contribute to the patient presenting condition    Health Maintenance   Topic Date Due    Pneumococcal 0-64 years Vaccine (1 of 1 - PPSV23) 05/04/1985    HIV screen  05/04/1994    Flu vaccine (1) 09/01/2019    Lipid screen  12/11/2023    DTaP/Tdap/Td vaccine (2 - Td) 12/16/2028
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
## 2019-07-25 NOTE — LETTER
[facility]  [facility] [address]  Phone: [phone]  Fax: [phone]    Fernando Benavides PA-C        July 25, 2019     Patient: Girma Martinez   YOB: 1980   Date of Visit: 7/25/2019       To Whom It May Concern: It is my medical opinion that Srinivasan Perkins requires a disability parking placard for the following reasons:  He has limited walking ability due to an arthritic and an orthopedic condition. Duration of need: 1 year    If you have any questions or concerns, please don't hesitate to call.     Sincerely,              Fernando Benavides PA-C

## 2019-07-26 ENCOUNTER — TELEPHONE (OUTPATIENT)
Dept: PRIMARY CARE CLINIC | Age: 39
End: 2019-07-26

## 2019-08-03 NOTE — TELEPHONE ENCOUNTER
Patient called to see if letter was sent to his . Explained letter was sent. Patient requested copy be e-mailed to him. Letter dated 07- was signed, printed and e-mailed to patient.     Last visit: 07-  Last Med refill: unknown  Does patient have enough medication for 72 hours: unknown    Next Visit Date:  Future Appointments   Date Time Provider eRza Hinkle   8/27/2019 11:40 AM Ashley Anna PA-C [facility] Maintenance   Topic Date Due    Pneumococcal 0-64 years Vaccine (1 of 1 - PPSV23) 07/25/2020 (Originally 5/4/1985)    HIV screen  07/25/2020 (Originally 5/4/1994)    Flu vaccine (1) 09/01/2019    Potassium monitoring  12/11/2019    Creatinine monitoring  12/11/2019    Lipid screen  12/11/2023    DTaP/Tdap/Td vaccine (2 - Td) 12/16/2028       No results found for: LABA1C          ( goal A1C is < 7)   No results found for: LABMICR  LDL Cholesterol (mg/dL)   Date Value   12/11/2018 137 (H)   04/08/2014 128       (goal LDL is <100)   AST (U/L)   Date Value   12/11/2018 27     ALT (U/L)   Date Value   12/11/2018 26     BUN (mg/dL)   Date Value   12/11/2018 11     BP Readings from Last 3 Encounters:   07/25/19 (!) 131/95   07/01/19 (!) 156/110   06/24/19 (!) 151/105          (goal 120/80)    All Future Testing planned in [facility]  Lab Frequency Next Occurrence               Patient Active Problem List:     Chronic back pain     Urethral stricture     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

## 2019-08-07 ASSESSMENT — ENCOUNTER SYMPTOMS
NAUSEA: 0
CONSTIPATION: 0
COUGH: 0
SHORTNESS OF BREATH: 0
WHEEZING: 0
VOMITING: 0
DIARRHEA: 0

## 2019-08-27 ENCOUNTER — OFFICE VISIT (OUTPATIENT)
Dept: PRIMARY CARE CLINIC | Age: 39
End: 2019-08-27
Payer: COMMERCIAL

## 2019-08-27 VITALS
DIASTOLIC BLOOD PRESSURE: 90 MMHG | TEMPERATURE: 97.7 F | HEART RATE: 77 BPM | BODY MASS INDEX: 39.05 KG/M2 | SYSTOLIC BLOOD PRESSURE: 125 MMHG | WEIGHT: 312.4 LBS

## 2019-08-27 DIAGNOSIS — Z76.0 MEDICATION REFILL: ICD-10-CM

## 2019-08-27 DIAGNOSIS — E66.01 CLASS 3 SEVERE OBESITY DUE TO EXCESS CALORIES WITH BODY MASS INDEX (BMI) OF 40.0 TO 44.9 IN ADULT, UNSPECIFIED WHETHER SERIOUS COMORBIDITY PRESENT (HCC): ICD-10-CM

## 2019-08-27 DIAGNOSIS — M54.50 LUMBAR BACK PAIN: ICD-10-CM

## 2019-08-27 DIAGNOSIS — I10 ESSENTIAL HYPERTENSION: Primary | ICD-10-CM

## 2019-08-27 PROCEDURE — 99214 OFFICE O/P EST MOD 30 MIN: CPT | Performed by: PHYSICIAN ASSISTANT

## 2019-08-27 RX ORDER — TRAMADOL HYDROCHLORIDE 50 MG/1
25 TABLET ORAL 2 TIMES DAILY PRN
Qty: 30 TABLET | Refills: 0 | Status: SHIPPED | OUTPATIENT
Start: 2019-08-27 | End: 2019-09-09 | Stop reason: SDUPTHER

## 2019-08-27 RX ORDER — PHENTERMINE HYDROCHLORIDE 37.5 MG/1
37.5 TABLET ORAL
Qty: 30 TABLET | Refills: 0 | Status: SHIPPED | OUTPATIENT
Start: 2019-08-27 | End: 2019-09-26

## 2019-08-27 RX ORDER — LISINOPRIL 20 MG/1
20 TABLET ORAL DAILY
Qty: 30 TABLET | Refills: 3 | Status: SHIPPED | OUTPATIENT
Start: 2019-08-27

## 2019-08-27 RX ORDER — LIDOCAINE 4 G/G
1 PATCH TOPICAL DAILY
Qty: 30 PATCH | Refills: 2 | Status: SHIPPED | OUTPATIENT
Start: 2019-08-27

## 2019-08-27 RX ORDER — DOCUSATE SODIUM 250 MG
250 CAPSULE ORAL 2 TIMES DAILY PRN
Qty: 60 CAPSULE | Refills: 1 | Status: SHIPPED | OUTPATIENT
Start: 2019-08-27

## 2019-08-27 ASSESSMENT — ENCOUNTER SYMPTOMS: BACK PAIN: 1

## 2019-08-27 NOTE — PROGRESS NOTES
[facility]  [facility] [address]  Dept: [phone]  Dept Fax: [phone]    Office Progress/Follow Up Note  Date of patient's visit: 8/27/2019  Patient's Name:  Jasmin Rousseau YOB: 1980            Patient Care Team:  Manjula Blanco PA-C as PCP - General (Physician Assistant)  Manjula Blanco PA-C as PCP - [facility] Empaneled Provider  Babak Mejia MD (Pain Management)  Clark Cole MD as Consulting Physician (Urology)  Cole Uribe DO as Consulting Physician (Pain Management)  Heather Farfan (Inactive) (Pain Management)  ================================================================    REASON FOR VISIT/CHIEF COMPLAINT:  Hypertension and Obesity    HISTORY OF PRESENTING ILLNESS:  History was obtained from: patient. Jasmin Rousseau is a 35 y.o. is here for follow-up for back pain, high blood pressure, obesity. Patient states he is compliant with medications. Patient states he was seen by pain management last week, received no benefit from steroid injection, was referred to a spine doctor, Dr. Victorino Banegas, informed patient will obtain pain management note, patient requesting pain medication. Back pain, medications in depth with patient, informed patient he will be prescribed pain medication to take PRN, patient voices understanding and agreement with treatment plan. Patient currently taking Adipex, has lost 8 pounds, states scale at home showed \"305\". Discussed weight loss in depth with patient, informed patient we will refill his Adipex. Patient blood pressure is slightly elevated in office, patient denies any side effects to blood pressure medication patient refill. She is requesting additional medication refills. Labs reviewed. Health maintenance reviewed.  OARRS, prescribed Tramadol 50 mg taken half a tablet twice daily as needed for 30 days, Adipex 37.5 mg taken 1 tablet daily for 30

## 2019-09-03 ENCOUNTER — TELEPHONE (OUTPATIENT)
Dept: PRIMARY CARE CLINIC | Age: 39
End: 2019-09-03

## 2019-09-03 DIAGNOSIS — Z01.818 PREOPERATIVE CLEARANCE: Primary | ICD-10-CM

## 2019-09-03 DIAGNOSIS — M54.50 LUMBAR BACK PAIN: ICD-10-CM

## 2019-09-03 DIAGNOSIS — E78.2 MIXED HYPERLIPIDEMIA: ICD-10-CM

## 2019-09-04 ENCOUNTER — HOSPITAL ENCOUNTER (OUTPATIENT)
Age: 39
Setting detail: SPECIMEN
Discharge: HOME | End: 2019-09-04
Payer: COMMERCIAL

## 2019-09-04 DIAGNOSIS — Z01.818 PREOPERATIVE CLEARANCE: ICD-10-CM

## 2019-09-04 LAB
ALBUMIN SERPL-MCNC: 4.1 G/DL (ref 3.5–5.2)
ALBUMIN/GLOBULIN RATIO: 1.3 (ref 1–2.5)
ALP BLD-CCNC: 50 U/L (ref 40–129)
ALT SERPL-CCNC: 28 U/L (ref 5–41)
ANION GAP SERPL CALCULATED.3IONS-SCNC: 9 MMOL/L (ref 9–17)
AST SERPL-CCNC: 23 U/L
BILIRUB SERPL-MCNC: 0.31 MG/DL (ref 0.3–1.2)
BUN BLDV-MCNC: 10 MG/DL (ref 6–20)
BUN/CREAT BLD: NORMAL (ref 9–20)
CALCIUM SERPL-MCNC: 9.7 MG/DL (ref 8.6–10.4)
CHLORIDE BLD-SCNC: 102 MMOL/L (ref 98–107)
CHOLESTEROL/HDL RATIO: 6.6
CHOLESTEROL: 243 MG/DL
CO2: 26 MMOL/L (ref 20–31)
CREAT SERPL-MCNC: 0.95 MG/DL (ref 0.7–1.2)
GFR AFRICAN AMERICAN: >60 ML/MIN
GFR NON-AFRICAN AMERICAN: >60 ML/MIN
GFR SERPL CREATININE-BSD FRML MDRD: NORMAL ML/MIN/{1.73_M2}
GFR SERPL CREATININE-BSD FRML MDRD: NORMAL ML/MIN/{1.73_M2}
GLUCOSE BLD-MCNC: 95 MG/DL (ref 70–99)
HDLC SERPL-MCNC: 37 MG/DL
LDL CHOLESTEROL DIRECT: 134 MG/DL
LDL CHOLESTEROL: ABNORMAL MG/DL (ref 0–130)
POTASSIUM SERPL-SCNC: 4.5 MMOL/L (ref 3.7–5.3)
SODIUM BLD-SCNC: 137 MMOL/L (ref 135–144)
TOTAL PROTEIN: 7.2 G/DL (ref 6.4–8.3)
TRIGL SERPL-MCNC: 424 MG/DL
VLDLC SERPL CALC-MCNC: ABNORMAL MG/DL (ref 1–30)

## 2019-09-05 LAB
HCT VFR BLD CALC: 46.3 % (ref 40.7–50.3)
HEMOGLOBIN: 14.5 G/DL (ref 13–17)
MCH RBC QN AUTO: 28 PG (ref 25.2–33.5)
MCHC RBC AUTO-ENTMCNC: 31.3 G/DL (ref 28.4–34.8)
MCV RBC AUTO: 89.4 FL (ref 82.6–102.9)
NRBC AUTOMATED: 0 PER 100 WBC
PDW BLD-RTO: 16.1 % (ref 11.8–14.4)
PLATELET # BLD: 328 K/UL (ref 138–453)
PMV BLD AUTO: 10.7 FL (ref 8.1–13.5)
RBC # BLD: 5.18 M/UL (ref 4.21–5.77)
WBC # BLD: 9.1 K/UL (ref 3.5–11.3)

## 2019-09-05 NOTE — TELEPHONE ENCOUNTER
Pt informed. Pt stated he had labs done already and will come in a  EKG order. Pt also stated that his ins denied the Tramadol and he would like to know what can be Rx'd for his back pain.

## 2019-09-06 ENCOUNTER — TELEPHONE (OUTPATIENT)
Dept: PRIMARY CARE CLINIC | Age: 39
End: 2019-09-06

## 2019-09-06 ENCOUNTER — HOSPITAL ENCOUNTER (OUTPATIENT)
Age: 39
Discharge: HOME | End: 2019-09-06
Payer: COMMERCIAL

## 2019-09-06 PROCEDURE — 93005 ELECTROCARDIOGRAM TRACING: CPT | Performed by: PHYSICIAN ASSISTANT

## 2019-09-07 ASSESSMENT — ENCOUNTER SYMPTOMS
WHEEZING: 0
VOMITING: 0
SHORTNESS OF BREATH: 0
DIARRHEA: 0
CONSTIPATION: 0
COUGH: 0
NAUSEA: 0

## 2019-09-09 LAB
EKG ATRIAL RATE: 69 BPM
EKG P AXIS: 31 DEGREES
EKG P-R INTERVAL: 220 MS
EKG Q-T INTERVAL: 402 MS
EKG QRS DURATION: 114 MS
EKG QTC CALCULATION (BAZETT): 430 MS
EKG R AXIS: -15 DEGREES
EKG T AXIS: 21 DEGREES
EKG VENTRICULAR RATE: 69 BPM

## 2019-09-09 RX ORDER — TRAMADOL HYDROCHLORIDE 50 MG/1
50 TABLET ORAL 2 TIMES DAILY PRN
Qty: 60 TABLET | Refills: 0 | Status: SHIPPED | OUTPATIENT
Start: 2019-09-09 | End: 2019-11-08

## 2019-09-09 RX ORDER — ATORVASTATIN CALCIUM 80 MG/1
80 TABLET, FILM COATED ORAL NIGHTLY
Qty: 30 TABLET | Refills: 2 | Status: SHIPPED | OUTPATIENT
Start: 2019-09-09 | End: 2019-12-03 | Stop reason: SDUPTHER

## 2020-11-11 ENCOUNTER — HOSPITAL ENCOUNTER (EMERGENCY)
Age: 40
Discharge: HOME | End: 2020-11-11
Attending: EMERGENCY MEDICINE
Payer: COMMERCIAL

## 2020-11-11 VITALS
RESPIRATION RATE: 18 BRPM | WEIGHT: 305 LBS | HEART RATE: 113 BPM | TEMPERATURE: 98.2 F | BODY MASS INDEX: 39.14 KG/M2 | DIASTOLIC BLOOD PRESSURE: 108 MMHG | HEIGHT: 74 IN | SYSTOLIC BLOOD PRESSURE: 148 MMHG | OXYGEN SATURATION: 97 %

## 2020-11-11 PROCEDURE — 99285 EMERGENCY DEPT VISIT HI MDM: CPT

## 2020-11-11 PROCEDURE — 6370000000 HC RX 637 (ALT 250 FOR IP): Performed by: STUDENT IN AN ORGANIZED HEALTH CARE EDUCATION/TRAINING PROGRAM

## 2020-11-11 RX ORDER — ACETAMINOPHEN 500 MG
1000 TABLET ORAL ONCE
Status: COMPLETED | OUTPATIENT
Start: 2020-11-11 | End: 2020-11-11

## 2020-11-11 RX ADMIN — ACETAMINOPHEN 1000 MG: 500 TABLET ORAL at 01:44

## 2020-11-11 ASSESSMENT — ENCOUNTER SYMPTOMS
NAUSEA: 0
BACK PAIN: 0
ABDOMINAL PAIN: 0
COUGH: 0
VOMITING: 0
SHORTNESS OF BREATH: 0

## 2020-11-11 ASSESSMENT — PAIN SCALES - GENERAL: PAINLEVEL_OUTOF10: 5
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
## 2021-03-02 ENCOUNTER — OFFICE VISIT (OUTPATIENT)
Dept: ORTHOPEDIC SURGERY | Age: 41
End: 2021-03-02
Payer: COMMERCIAL

## 2021-03-02 VITALS — TEMPERATURE: 96.8 F

## 2021-03-02 DIAGNOSIS — M25.562 ACUTE PAIN OF LEFT KNEE: Primary | ICD-10-CM

## 2021-03-02 DIAGNOSIS — M22.42 PATELLOFEMORAL CHONDROSIS, LEFT: ICD-10-CM

## 2021-03-02 PROCEDURE — G8427 DOCREV CUR MEDS BY ELIG CLIN: HCPCS | Performed by: PHYSICIAN ASSISTANT

## 2021-03-02 PROCEDURE — 99204 OFFICE O/P NEW MOD 45 MIN: CPT | Performed by: PHYSICIAN ASSISTANT

## 2021-03-02 PROCEDURE — 4004F PT TOBACCO SCREEN RCVD TLK: CPT | Performed by: PHYSICIAN ASSISTANT

## 2021-03-02 PROCEDURE — G8484 FLU IMMUNIZE NO ADMIN: HCPCS | Performed by: PHYSICIAN ASSISTANT

## 2021-03-02 PROCEDURE — G8419 CALC BMI OUT NRM PARAM NOF/U: HCPCS | Performed by: PHYSICIAN ASSISTANT

## 2021-03-02 RX ORDER — METHYLPREDNISOLONE 4 MG/1
4 TABLET ORAL SEE ADMIN INSTRUCTIONS
Qty: 1 KIT | Refills: 0 | Status: SHIPPED | OUTPATIENT
Start: 2021-03-02 | End: 2021-03-08

## 2021-03-02 RX ORDER — MELOXICAM 15 MG/1
TABLET ORAL
COMMUNITY
Start: 2021-03-01

## 2021-03-02 RX ORDER — HYDROCHLOROTHIAZIDE 12.5 MG/1
TABLET ORAL
COMMUNITY
Start: 2021-03-01

## 2021-03-02 RX ORDER — METOPROLOL SUCCINATE 100 MG/1
TABLET, EXTENDED RELEASE ORAL
COMMUNITY
Start: 2021-03-01

## 2021-03-02 RX ORDER — CHOLECALCIFEROL (VITAMIN D3) 50 MCG
TABLET ORAL
COMMUNITY
Start: 2021-03-01
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
## 2021-03-03 ENCOUNTER — TELEPHONE (OUTPATIENT)
Dept: ORTHOPEDIC SURGERY | Age: 41
End: 2021-03-03

## 2021-03-03 NOTE — TELEPHONE ENCOUNTER
[facility] says that they have received the provider notes but not the official referral script for the office.   Please fax to [phone]

## 2022-01-03 ENCOUNTER — OFFICE VISIT (OUTPATIENT)
Dept: ORTHOPEDIC SURGERY | Age: 42
End: 2022-01-03
Payer: COMMERCIAL

## 2022-01-03 VITALS — WEIGHT: 305 LBS | BODY MASS INDEX: 39.14 KG/M2 | HEIGHT: 74 IN

## 2022-01-03 DIAGNOSIS — M25.562 CHRONIC PAIN OF LEFT KNEE: Primary | ICD-10-CM

## 2022-01-03 DIAGNOSIS — M23.92 INTERNAL DERANGEMENT OF LEFT KNEE: ICD-10-CM

## 2022-01-03 DIAGNOSIS — G89.29 CHRONIC PAIN OF LEFT KNEE: Primary | ICD-10-CM

## 2022-01-03 PROCEDURE — 99214 OFFICE O/P EST MOD 30 MIN: CPT | Performed by: PHYSICIAN ASSISTANT

## 2022-01-03 PROCEDURE — G8427 DOCREV CUR MEDS BY ELIG CLIN: HCPCS | Performed by: PHYSICIAN ASSISTANT

## 2022-01-03 PROCEDURE — G8484 FLU IMMUNIZE NO ADMIN: HCPCS | Performed by: PHYSICIAN ASSISTANT

## 2022-01-03 PROCEDURE — 4004F PT TOBACCO SCREEN RCVD TLK: CPT | Performed by: PHYSICIAN ASSISTANT

## 2022-01-03 PROCEDURE — G8419 CALC BMI OUT NRM PARAM NOF/U: HCPCS | Performed by: PHYSICIAN ASSISTANT
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
## 2022-01-11 ENCOUNTER — HOSPITAL ENCOUNTER (OUTPATIENT)
Dept: MRI IMAGING | Facility: CLINIC | Age: 42
Discharge: HOME | End: 2022-01-13
Payer: COMMERCIAL

## 2022-01-11 DIAGNOSIS — M25.562 CHRONIC PAIN OF LEFT KNEE: ICD-10-CM

## 2022-01-11 DIAGNOSIS — G89.29 CHRONIC PAIN OF LEFT KNEE: ICD-10-CM

## 2022-01-11 PROCEDURE — 73721 MRI JNT OF LWR EXTRE W/O DYE: CPT

## 2022-01-12 ENCOUNTER — TELEPHONE (OUTPATIENT)
Dept: ORTHOPEDIC SURGERY | Age: 42
End: 2022-01-12

## 2022-02-02 ENCOUNTER — OFFICE VISIT (OUTPATIENT)
Dept: ORTHOPEDIC SURGERY | Age: 42
End: 2022-02-02
Payer: COMMERCIAL

## 2022-02-02 DIAGNOSIS — M25.562 CHRONIC PAIN OF LEFT KNEE: Primary | ICD-10-CM

## 2022-02-02 DIAGNOSIS — G89.29 CHRONIC PAIN OF LEFT KNEE: Primary | ICD-10-CM

## 2022-02-02 PROCEDURE — 4004F PT TOBACCO SCREEN RCVD TLK: CPT | Performed by: ORTHOPAEDIC SURGERY

## 2022-02-02 PROCEDURE — G8484 FLU IMMUNIZE NO ADMIN: HCPCS | Performed by: ORTHOPAEDIC SURGERY

## 2022-02-02 PROCEDURE — 99213 OFFICE O/P EST LOW 20 MIN: CPT | Performed by: ORTHOPAEDIC SURGERY

## 2022-02-02 PROCEDURE — G8428 CUR MEDS NOT DOCUMENT: HCPCS | Performed by: ORTHOPAEDIC SURGERY

## 2022-02-02 PROCEDURE — G8419 CALC BMI OUT NRM PARAM NOF/U: HCPCS | Performed by: ORTHOPAEDIC SURGERY

## 2022-02-02 NOTE — PROGRESS NOTES
Frida Castillo M.D.            [address] [facility] [address] [facility],[address], [address] [facility]           Dept Phone: [phone]           Dept Fax:  [phone]           Sapna Cronin          Dept Phone: [phone]           Dept Fax:  [phone]      Chief Compliant:  Chief Complaint   Patient presents with    Pain     Lt knee         History of Present Illness: This is a 42 y.o. male who presents to the clinic today for evaluation / follow up of left knee pain. Patient is a 56-year-old gentleman who was seen by Dana Liu in the past.  Patient gives a year long history of knee pain he describes and then in 20 pivots and twisted his knee sometimes feels like it wants to give out on him. He did have an MRI that was performed on 1/11/2022. That showed that he had pretty significant degeneration and fraying of the superior articular surface of the medial meniscus. 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 .       Review of Systems   Constitutional: Negative for fever, chills, sweats. Eyes: Negative for changes in vision, or pain. HENT: Negative for ear ache, epistaxis, or sore throat. Respiratory/Cardio: Negative for Chest pain, palpitations, SOB, or cough. Gastrointestinal: Negative for abdominal pain, N/V/D. Genitourinary: Negative for dysuria, frequency, urgency, or hematuria. Neurological: Negative for headache, numbness, or weakness. Integumentary: Negative for rash, itching, laceration, or abrasion. Musculoskeletal: Positive for Pain (Lt knee )       Physical Exam:  Constitutional: Patient is oriented to person, place, and time. Patient appears well-developed and well nourished.    HENT: Negative otherwise noted  Head: Normocephalic and Atraumatic  Nose: Normal  Eyes: Conjunctivae and EOM are normal  Neck: Normal range of motion Neck supple. Respiratory/Cardio: Effort normal. No respiratory distress. Musculoskeletal: Examination patient's left knee notes no obvious effusion. Knee motion zero 135 degrees he has a positive Donald's more laterally than medially. He does give tightness and pain with flexion after this endpoint on Lachman's is good collaterals are good no obvious effusion no patellofemoral apprehension. Neurological: Patient is alert and oriented to person, place, and time. Normal strenght. No sensory deficit. Skin: Skin is warm and dry  Psychiatric: Behavior is normal. Thought content normal.  Nursing note and vitals reviewed. Labs and Imaging:     XR taken today:  No results found. No orders of the defined types were placed in this encounter. Assessment and Plan:  1. Chronic pain of left knee    2. Medial meniscus tear left knee        This is a 42 y.o. male who presents to the clinic today for evaluation / follow up of medial meniscus tear left knee.      Past History:    Current Outpatient Medications:     metoprolol succinate (TOPROL XL) 100 MG extended release tablet, , Disp: , Rfl:     vitamin D (CHOLECALCIFEROL) 50 MCG (2000 UT) TABS tablet, , Disp: , Rfl:     meloxicam (MOBIC) 15 MG tablet, , Disp: , Rfl:     hydroCHLOROthiazide (HYDRODIURIL) 12.5 MG tablet, , Disp: , Rfl:     atorvastatin (LIPITOR) 80 MG tablet, Take 1 tablet by mouth daily, Disp: 30 tablet, Rfl: 1    lisinopril (PRINIVIL;ZESTRIL) 20 MG tablet, Take 1 tablet by mouth daily (Patient taking differently: Take 40 mg by mouth daily ), Disp: 30 tablet, Rfl: 3    docusate sodium (COLACE) 250 MG capsule, Take 1 capsule by mouth 2 times daily as needed for Constipation, Disp: 60 capsule, Rfl: 1    diclofenac (VOLTAREN) 50 MG EC tablet, Take 1 tablet by mouth 2 times daily (Patient not taking: Reported on 3/2/2021), Disp: 60 tablet, Rfl: 2    lidocaine 4 % external patch, Place 1 patch onto the skin daily (Patient not taking: Reported on 3/2/2021), Disp: 30 patch, Rfl: 2    Handicap Placard [facility], by Does not apply route, Disp: 1 each, Rfl: 0    Blood Pressure Monitoring (B-D ASSURE BPM/AUTO ARM CUFF) MISC, Provide insurance covered BP cuff, check blood pressure 3-4 times weekly and record, Disp: 1 each, Rfl: 0    ibuprofen (IBU) 600 MG tablet, Take 1 tablet by mouth every 6 hours as needed for Pain (Patient not taking: Reported on 7/25/2019), Disp: 40 tablet, Rfl: 0  No Known Allergies  Social History     Socioeconomic History    Marital status: Single     Spouse name: Not on file    Number of children: Not on file    Years of education: Not on file    Highest education level: Not on file   Occupational History    Occupation: self-employed     Comment: caring for elderly parents   Tobacco Use    Smoking status: Current Every Day Smoker     Packs/day: 0.25     Years: 18.00     Pack years: 4.50     Types: Cigarettes    Smokeless tobacco: Never Used    Tobacco comment: <2 cigs \"not daily\" as of 8/27/19   Vaping Use    Vaping Use: Never used   Substance and Sexual Activity    Alcohol use: Yes     Alcohol/week: 0.0 standard drinks     Comment: occasionally    Drug use: Yes     Types: Marijuana Deboraha Sanchuy)     Comment: [phone] +THC in UDS     Sexual activity: Yes     Partners: Female   Other Topics Concern    Not on file   Social History Narrative    Not on file     Social Determinants of Health     Financial Resource Strain:     Difficulty of Paying Living Expenses: Not on file   Food Insecurity:     Worried About Running Out of Food in the Last Year: Not on file    [place] of Food in the Last Year: Not on file   Transportation Needs:     Lack of Transportation (Medical): Not on file    Lack of Transportation (Non-Medical):  Not on file   Physical Activity:     Days of Exercise per Week: Not on file    Minutes of Exercise per Session: Not on file   Stress:     Feeling of Stress : Not on file   Social Connections:     Frequency of Communication with Friends and Family: Not on file    Frequency of Social Gatherings with Friends and Family: Not on file    Attends Congregational Services: Not on file    Active Member of Clubs or Organizations: Not on file    Attends Club or Organization Meetings: Not on file    Marital Status: Not on file   Intimate Partner Violence:     Fear of Current or Ex-Partner: Not on file    Emotionally Abused: Not on file    Physically Abused: Not on file    Sexually Abused: Not on file   Housing Stability:     Unable to Pay for Housing in the Last Year: Not on file    Number of [place] in the Last Year: Not on file    Unstable Housing in the Last Year: Not on file     Past Medical History:   Diagnosis Date    Back pain     Bladder disorder, other     Childhood asthma     H/O gastroesophageal reflux (GERD)     mild    Lumbar disc disease     Morbid obesity ([address]     Snores      Past Surgical History:   Procedure Laterality Date    CIRCUMCISION      CYSTOSCOPY      with urethral dilation    WISDOM TOOTH EXTRACTION       Family History   Problem Relation Age of Onset    Heart Disease Mother     Arthritis Mother     Diabetes Mother     Hypertension Mother     Hypertension Father     Lung Cancer Brother     Esophageal Cancer Maternal Grandmother    Plan  Patient to be scheduled for arthroscopic partial meniscectomies left knee. We discussed the details procedure as well as the risk and benefits. Patient states that this has been bothering for a year and he wishes to have this done. Also given schedule accordingly      Provider Attestation:  Greg Cordoba, personally performed the services described in this documentation. All medical record entries made by the scribe were at my direction and in my presence.  I have reviewed the chart and discharge instructions and agree that the records reflect my personal performance and is accurate and

## 2022-02-14 PROBLEM — Z01.818 PREOP EXAMINATION: Status: ACTIVE | Noted: 2022-02-14

## 2022-03-16 PROBLEM — Z01.818 PREOP EXAMINATION: Status: RESOLVED | Noted: 2022-02-14 | Resolved: 2022-03-16

## 2024-08-18 ENCOUNTER — HOSPITAL ENCOUNTER (EMERGENCY)
Age: 44
Discharge: HOME | End: 2024-08-18
Attending: STUDENT IN AN ORGANIZED HEALTH CARE EDUCATION/TRAINING PROGRAM
Payer: COMMERCIAL

## 2024-08-18 VITALS
BODY MASS INDEX: 40.43 KG/M2 | DIASTOLIC BLOOD PRESSURE: 79 MMHG | TEMPERATURE: 98.4 F | RESPIRATION RATE: 16 BRPM | SYSTOLIC BLOOD PRESSURE: 121 MMHG | HEIGHT: 74 IN | HEART RATE: 72 BPM | OXYGEN SATURATION: 97 % | WEIGHT: 315 LBS

## 2024-08-18 DIAGNOSIS — L02.31 GLUTEAL ABSCESS: Primary | ICD-10-CM

## 2024-08-18 PROCEDURE — 99283 EMERGENCY DEPT VISIT LOW MDM: CPT

## 2024-08-18 PROCEDURE — 6370000000 HC RX 637 (ALT 250 FOR IP): Performed by: STUDENT IN AN ORGANIZED HEALTH CARE EDUCATION/TRAINING PROGRAM

## 2024-08-18 PROCEDURE — 2500000003 HC RX 250 WO HCPCS: Performed by: STUDENT IN AN ORGANIZED HEALTH CARE EDUCATION/TRAINING PROGRAM

## 2024-08-18 PROCEDURE — 10060 I&D ABSCESS SIMPLE/SINGLE: CPT

## 2024-08-18 RX ORDER — LIDOCAINE HYDROCHLORIDE 10 MG/ML
5 INJECTION, SOLUTION INFILTRATION; PERINEURAL ONCE
Status: COMPLETED | OUTPATIENT
Start: 2024-08-18 | End: 2024-08-18

## 2024-08-18 RX ORDER — SULFAMETHOXAZOLE AND TRIMETHOPRIM 800; 160 MG/1; MG/1
1 TABLET ORAL ONCE
Status: COMPLETED | OUTPATIENT
Start: 2024-08-18 | End: 2024-08-18

## 2024-08-18 RX ORDER — CEPHALEXIN 250 MG/1
500 CAPSULE ORAL ONCE
Status: COMPLETED | OUTPATIENT
Start: 2024-08-18 | End: 2024-08-18

## 2024-08-18 RX ORDER — SULFAMETHOXAZOLE AND TRIMETHOPRIM 800; 160 MG/1; MG/1
1 TABLET ORAL 2 TIMES DAILY
Qty: 14 TABLET | Refills: 0 | Status: SHIPPED | OUTPATIENT
Start: 2024-08-18 | End: 2024-08-25

## 2024-08-18 RX ORDER — CEPHALEXIN 500 MG/1
500 CAPSULE ORAL 3 TIMES DAILY
Qty: 21 CAPSULE | Refills: 0 | Status: SHIPPED | OUTPATIENT
Start: 2024-08-18 | End: 2024-08-25

## 2024-08-18 RX ADMIN — SULFAMETHOXAZOLE AND TRIMETHOPRIM 1 TABLET: 800; 160 TABLET ORAL at 05:58

## 2024-08-18 RX ADMIN — CEPHALEXIN 500 MG: 250 CAPSULE ORAL at 05:58

## 2024-08-18 RX ADMIN — LIDOCAINE HYDROCHLORIDE 5 ML: 10 INJECTION, SOLUTION INFILTRATION; PERINEURAL at 07:18

## 2024-08-18 ASSESSMENT — PAIN DESCRIPTION - PAIN TYPE: TYPE: ACUTE PAIN

## 2024-08-18 ASSESSMENT — PAIN - FUNCTIONAL ASSESSMENT: PAIN_FUNCTIONAL_ASSESSMENT: 0-10

## 2024-08-18 ASSESSMENT — PAIN SCALES - GENERAL: PAINLEVEL_OUTOF10: 10

## 2024-08-18 ASSESSMENT — LIFESTYLE VARIABLES
HOW MANY STANDARD DRINKS CONTAINING ALCOHOL DO YOU HAVE ON A TYPICAL DAY: 1 OR 2
HOW OFTEN DO YOU HAVE A DRINK CONTAINING ALCOHOL: 2-4 TIMES A MONTH

## 2024-08-18 ASSESSMENT — PAIN DESCRIPTION - LOCATION: LOCATION: SACRUM

## 2024-08-18 NOTE — ED PROVIDER NOTES
EMERGENCY DEPARTMENT ENCOUNTER    Pt Name: Senthil Bartholomew  MRN: [id number]  Birthdate 1980  Date of evaluation: 8/18/24  CHIEF COMPLAINT       Chief Complaint   Patient presents with    Abscess     HISTORY OF PRESENT ILLNESS   44-year-old presenting with concern about an abscess    States he feels like he has an abscess on his gluteal cleft    Going on for couple days    No other systemic symptoms no nausea vomiting abdominal pain no fevers or chills              REVIEW OF SYSTEMS     Review of Systems   Constitutional:  Negative for chills and fever.   HENT:  Negative for rhinorrhea and sore throat.    Eyes:  Negative for discharge and redness.   Respiratory:  Negative for chest tightness and shortness of breath.    Cardiovascular:  Negative for chest pain.   Gastrointestinal:  Negative for abdominal pain, diarrhea, nausea and vomiting.   Genitourinary:  Negative for dysuria and frequency.   Musculoskeletal:  Negative for arthralgias and myalgias.   Skin:  Positive for wound. Negative for rash.   Neurological:  Negative for weakness and numbness.   Psychiatric/Behavioral:  Negative for suicidal ideas.    All other systems reviewed and are negative.    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

## 2024-08-18 NOTE — ED NOTES
Dr. Cedillo in and did incision and drainage of right buttocks abscess and packing put into wound by dr cedillo and then dressing applied.

## 2025-01-15 NOTE — PROGRESS NOTES
Senthil Bartholomew (:  1980) is a 44 y.o. male,New patient, here for evaluation of the following chief complaint(s):  Establish Care, New Patient, Hypertension (Discuss Lisinopril, has a heaviness in chest that comes and goes, at times felt like he was going to pass out ), and Weight Management (Discuss Adipex)         Assessment & Plan  Encounter for medical examination to establish care  New patient to clinic here to establish care, pmh reviewed   Previous history and labs reviewed, will be refilling medications today  Issues with lisinopril, has not been taking it due to side effects of feeling heavy on his chest whenever he takes it   Patient currently on toprol and hctz for blood pressure control, blood pressure stable today, will stop lisinopril today and reevaluate in one month         Screening for hyperlipidemia   Due, will order and follow up     Orders:    Lipid Panel; Future    Diabetes mellitus screening   Due, will order and follow up     Orders:    Hemoglobin A1C; Future    Basic Metabolic Panel; Future    Encounter for screening for cardiovascular disorders   Due for blood work, will order and follow up     Orders:    CBC with Auto Differential; Future    Mixed hyperlipidemia   Stable on Lipitor, will refill today   Will order lipid panel and follow up     Orders:    atorvastatin (LIPITOR) 80 MG tablet; Take 1 tablet by mouth daily    Medication refill   Will refill today     Orders:    docusate sodium (COLACE) 250 MG capsule; Take 1 capsule by mouth 2 times daily as needed for Constipation    Moderate food insecurity   Will refer to social determinants of health       Orders:    [facility]erral for Social Determinants of Health (Primary Care Practices)    Financial difficulties   Referred to social determinants of health     Orders:    [facility]erral for Social Determinants of Health (Primary Care Practices)      Return in about 1 month (around 2025).       Subjective   44 yr here to

## 2025-01-17 ENCOUNTER — OFFICE VISIT (OUTPATIENT)
Dept: FAMILY MEDICINE CLINIC | Age: 45
End: 2025-01-17
Payer: COMMERCIAL

## 2025-01-17 VITALS
TEMPERATURE: 99.5 F | HEART RATE: 82 BPM | DIASTOLIC BLOOD PRESSURE: 76 MMHG | OXYGEN SATURATION: 97 % | SYSTOLIC BLOOD PRESSURE: 110 MMHG | BODY MASS INDEX: 42.27 KG/M2 | WEIGHT: 315 LBS

## 2025-01-17 DIAGNOSIS — Z13.6 ENCOUNTER FOR SCREENING FOR CARDIOVASCULAR DISORDERS: ICD-10-CM

## 2025-01-17 DIAGNOSIS — Z76.0 MEDICATION REFILL: ICD-10-CM

## 2025-01-17 DIAGNOSIS — Z00.00 ENCOUNTER FOR MEDICAL EXAMINATION TO ESTABLISH CARE: Primary | ICD-10-CM

## 2025-01-17 DIAGNOSIS — Z13.220 SCREENING FOR HYPERLIPIDEMIA: ICD-10-CM

## 2025-01-17 DIAGNOSIS — E78.2 MIXED HYPERLIPIDEMIA: ICD-10-CM

## 2025-01-17 DIAGNOSIS — Z59.41 MODERATE FOOD INSECURITY: ICD-10-CM

## 2025-01-17 DIAGNOSIS — Z13.1 DIABETES MELLITUS SCREENING: ICD-10-CM

## 2025-01-17 DIAGNOSIS — Z59.9 FINANCIAL DIFFICULTIES: ICD-10-CM

## 2025-01-17 PROCEDURE — 99204 OFFICE O/P NEW MOD 45 MIN: CPT | Performed by: STUDENT IN AN ORGANIZED HEALTH CARE EDUCATION/TRAINING PROGRAM

## 2025-01-17 RX ORDER — METOPROLOL SUCCINATE 100 MG/1
100 TABLET, EXTENDED RELEASE ORAL DAILY
Qty: 90 TABLET | Refills: 0 | Status: SHIPPED | OUTPATIENT
Start: 2025-01-17 | End: 2025-04-17

## 2025-01-17 RX ORDER — MELOXICAM 15 MG/1
15 TABLET ORAL DAILY
Qty: 30 TABLET | Refills: 0 | Status: SHIPPED | OUTPATIENT
Start: 2025-01-17 | End: 2025-02-16

## 2025-01-17 RX ORDER — DOCUSATE SODIUM 250 MG
250 CAPSULE ORAL 2 TIMES DAILY PRN
Qty: 60 CAPSULE | Refills: 1 | Status: SHIPPED | OUTPATIENT
Start: 2025-01-17

## 2025-01-17 RX ORDER — HYDROCHLOROTHIAZIDE 12.5 MG/1
12.5 TABLET ORAL DAILY
Qty: 30 TABLET | Refills: 1 | Status: SHIPPED | OUTPATIENT
Start: 2025-01-17 | End: 2025-04-17

## 2025-01-17 RX ORDER — ATORVASTATIN CALCIUM 80 MG/1
80 TABLET, FILM COATED ORAL DAILY
Qty: 30 TABLET | Refills: 1 | Status: SHIPPED | OUTPATIENT
Start: 2025-01-17

## 2025-01-17 SDOH — ECONOMIC STABILITY - FOOD INSECURITY: FOOD INSECURITY: Z59.41

## 2025-01-17 SDOH — ECONOMIC STABILITY: FOOD INSECURITY: WITHIN THE PAST 12 MONTHS, THE FOOD YOU BOUGHT JUST DIDN'T LAST AND YOU DIDN'T HAVE MONEY TO GET MORE.: SOMETIMES TRUE

## 2025-01-17 SDOH — ECONOMIC STABILITY - INCOME SECURITY: PROBLEM RELATED TO HOUSING AND ECONOMIC CIRCUMSTANCES, UNSPECIFIED: Z59.9

## 2025-01-17 SDOH — ECONOMIC STABILITY: FOOD INSECURITY: WITHIN THE PAST 12 MONTHS, YOU WORRIED THAT YOUR FOOD WOULD RUN OUT BEFORE YOU GOT MONEY TO BUY MORE.: SOMETIMES TRUE

## 2025-01-17 SDOH — HEALTH STABILITY: PHYSICAL HEALTH: ON AVERAGE, HOW MANY MINUTES DO YOU ENGAGE IN EXERCISE AT THIS LEVEL?: 0 MIN

## 2025-01-17 SDOH — HEALTH STABILITY: PHYSICAL HEALTH: ON AVERAGE, HOW MANY DAYS PER WEEK DO YOU ENGAGE IN MODERATE TO STRENUOUS EXERCISE (LIKE A BRISK WALK)?: 0 DAYS

## 2025-01-17 ASSESSMENT — PATIENT HEALTH QUESTIONNAIRE - PHQ9
SUM OF ALL RESPONSES TO PHQ QUESTIONS 1-9: 0
SUM OF ALL RESPONSES TO PHQ9 QUESTIONS 1 & 2: 0
SUM OF ALL RESPONSES TO PHQ QUESTIONS 1-9: 0
2. FEELING DOWN, DEPRESSED OR HOPELESS: NOT AT ALL
SUM OF ALL RESPONSES TO PHQ QUESTIONS 1-9: 0
1. LITTLE INTEREST OR PLEASURE IN DOING THINGS: NOT AT ALL
SUM OF ALL RESPONSES TO PHQ QUESTIONS 1-9: 0

## 2025-01-20 ASSESSMENT — ENCOUNTER SYMPTOMS
GASTROINTESTINAL NEGATIVE: 1
RESPIRATORY NEGATIVE: 1
ALLERGIC/IMMUNOLOGIC NEGATIVE: 1
EYES NEGATIVE: 1

## 2025-01-27 ENCOUNTER — HOSPITAL ENCOUNTER (OUTPATIENT)
Age: 45
Setting detail: SPECIMEN
Discharge: HOME | End: 2025-01-27

## 2025-01-27 DIAGNOSIS — Z13.220 SCREENING FOR HYPERLIPIDEMIA: ICD-10-CM

## 2025-01-27 DIAGNOSIS — Z13.6 ENCOUNTER FOR SCREENING FOR CARDIOVASCULAR DISORDERS: ICD-10-CM

## 2025-01-27 DIAGNOSIS — Z13.1 DIABETES MELLITUS SCREENING: ICD-10-CM

## 2025-01-27 LAB
ANION GAP SERPL CALCULATED.3IONS-SCNC: 10 MMOL/L (ref 9–16)
BASOPHILS # BLD: 0.1 K/UL (ref 0–0.2)
BASOPHILS NFR BLD: 1 % (ref 0–2)
BUN SERPL-MCNC: 13 MG/DL (ref 6–20)
CALCIUM SERPL-MCNC: 9.7 MG/DL (ref 8.6–10.4)
CHLORIDE SERPL-SCNC: 105 MMOL/L (ref 98–107)
CHOLEST SERPL-MCNC: 155 MG/DL (ref 0–199)
CHOLESTEROL/HDL RATIO: 3.9
CO2 SERPL-SCNC: 26 MMOL/L (ref 20–31)
CREAT SERPL-MCNC: 1.2 MG/DL (ref 0.7–1.2)
EOSINOPHIL # BLD: 0.24 K/UL (ref 0–0.44)
EOSINOPHILS RELATIVE PERCENT: 3 % (ref 1–4)
ERYTHROCYTE [DISTWIDTH] IN BLOOD BY AUTOMATED COUNT: 15.7 % (ref 11.8–14.4)
EST. AVERAGE GLUCOSE BLD GHB EST-MCNC: 117 MG/DL
GFR, ESTIMATED: 76 ML/MIN/1.73M2
GLUCOSE SERPL-MCNC: 88 MG/DL (ref 74–99)
HBA1C MFR BLD: 5.7 % (ref 4–6)
HCT VFR BLD AUTO: 43.5 % (ref 40.7–50.3)
HDLC SERPL-MCNC: 40 MG/DL
HGB BLD-MCNC: 13.4 G/DL (ref 13–17)
IMM GRANULOCYTES # BLD AUTO: 0.06 K/UL (ref 0–0.3)
IMM GRANULOCYTES NFR BLD: 1 %
LDLC SERPL CALC-MCNC: 57 MG/DL (ref 0–100)
LYMPHOCYTES NFR BLD: 4.35 K/UL (ref 1.1–3.7)
LYMPHOCYTES RELATIVE PERCENT: 44 % (ref 24–43)
MCH RBC QN AUTO: 26.9 PG (ref 25.2–33.5)
MCHC RBC AUTO-ENTMCNC: 30.8 G/DL (ref 28.4–34.8)
MCV RBC AUTO: 87.2 FL (ref 82.6–102.9)
MONOCYTES NFR BLD: 0.54 K/UL (ref 0.1–1.2)
MONOCYTES NFR BLD: 6 % (ref 3–12)
NEUTROPHILS NFR BLD: 45 % (ref 36–65)
NEUTS SEG NFR BLD: 4.39 K/UL (ref 1.5–8.1)
NRBC BLD-RTO: 0 PER 100 WBC
PLATELET # BLD AUTO: 296 K/UL (ref 138–453)
PMV BLD AUTO: 10.4 FL (ref 8.1–13.5)
POTASSIUM SERPL-SCNC: 4 MMOL/L (ref 3.7–5.3)
RBC # BLD AUTO: 4.99 M/UL (ref 4.21–5.77)
RBC # BLD: ABNORMAL 10*6/UL
SODIUM SERPL-SCNC: 141 MMOL/L (ref 136–145)
TRIGL SERPL-MCNC: 292 MG/DL
VLDLC SERPL CALC-MCNC: 58 MG/DL (ref 1–30)
WBC OTHER # BLD: 9.7 K/UL (ref 3.5–11.3)

## 2025-01-31 NOTE — RESULT ENCOUNTER NOTE
Can we let him know that blood work looks good, just needs to watch his sugar levels, he is in the prediabetic range so we need to reduce unnecessary sugar intake and decrease excess carbohydrate intake. If he drinks soda and juices please limit intake, otherwise we will just keep watch on this. Also just needs to watch some of his cholesterol intake such as greasy food, heavy cream food and incorporate more lean meats, losing weight would also help with these levels.

## 2025-02-07 ENCOUNTER — TELEPHONE (OUTPATIENT)
Dept: FAMILY MEDICINE CLINIC | Age: 45
End: 2025-02-07

## 2025-02-07 NOTE — TELEPHONE ENCOUNTER
Senthil Bartholomew was contacted by Meme Serrano MA, a Community Health Navigator, regarding a Social Determinants of Health referral.     Patient answered the phone, but was unable to discuss SDOH needs at this time.    Follow-up attempt.

## 2025-02-10 NOTE — TELEPHONE ENCOUNTER
Senthil Bartholomew was contacted by Meme Serrano MA, a Community Health Navigator, regarding a Social Determinants of Health referral.     Writer spoke with patient wife earlier today, both had referral placed for assistance. Information sent to the home for food and heap application for utilities.    Will close referral.

## 2025-02-18 NOTE — PROGRESS NOTES
reviewed.   Constitutional:       Appearance: Normal appearance.   HENT:      Head: Normocephalic and atraumatic.   Cardiovascular:      Rate and Rhythm: Normal rate and regular rhythm.      Pulses: Normal pulses.      Heart sounds: Normal heart sounds.   Pulmonary:      Effort: Pulmonary effort is normal.      Breath sounds: Normal breath sounds.   Musculoskeletal:         General: Tenderness (with range of motion of lower back) present.      Cervical back: Normal range of motion and neck supple.   Skin:     General: Skin is warm.      Capillary Refill: Capillary refill takes less than 2 seconds.   Neurological:      General: No focal deficit present.      Mental Status: He is alert and oriented to person, place, and time.                  An electronic signature was used to authenticate this note.    --Nola Blackwell MD

## 2025-02-19 ENCOUNTER — OFFICE VISIT (OUTPATIENT)
Dept: FAMILY MEDICINE CLINIC | Age: 45
End: 2025-02-19

## 2025-02-19 VITALS
DIASTOLIC BLOOD PRESSURE: 80 MMHG | BODY MASS INDEX: 40.43 KG/M2 | HEART RATE: 64 BPM | HEIGHT: 74 IN | WEIGHT: 315 LBS | OXYGEN SATURATION: 94 % | SYSTOLIC BLOOD PRESSURE: 124 MMHG
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
## 2025-02-19 DIAGNOSIS — E78.2 MIXED HYPERLIPIDEMIA: ICD-10-CM

## 2025-02-19 DIAGNOSIS — I10 ESSENTIAL HYPERTENSION: ICD-10-CM

## 2025-02-19 DIAGNOSIS — M54.50 LUMBAR BACK PAIN: ICD-10-CM

## 2025-02-19 DIAGNOSIS — R73.03 PREDIABETES: Primary | ICD-10-CM

## 2025-02-19 RX ORDER — METOPROLOL SUCCINATE 100 MG/1
100 TABLET, EXTENDED RELEASE ORAL DAILY
Qty: 90 TABLET | Refills: 0 | Status: SHIPPED | OUTPATIENT
Start: 2025-02-19 | End: 2025-05-20

## 2025-02-19 RX ORDER — LISINOPRIL 40 MG/1
40 TABLET ORAL DAILY
Qty: 90 TABLET | Refills: 0 | Status: SHIPPED | OUTPATIENT
Start: 2025-02-19

## 2025-02-19 RX ORDER — NAPROXEN 500 MG/1
500 TABLET ORAL 2 TIMES DAILY WITH MEALS
Qty: 60 TABLET | Refills: 0 | Status: SHIPPED | OUTPATIENT
Start: 2025-02-19

## 2025-02-19 RX ORDER — ATORVASTATIN CALCIUM 80 MG/1
80 TABLET, FILM COATED ORAL DAILY
Qty: 30 TABLET | Refills: 1 | Status: SHIPPED | OUTPATIENT
Start: 2025-02-19

## 2025-02-19 RX ORDER — LISINOPRIL 40 MG/1
40 TABLET ORAL DAILY
COMMUNITY
Start: 2025-02-18 | End: 2025-02-19 | Stop reason: SDUPTHER

## 2025-02-23 PROBLEM — I10 ESSENTIAL HYPERTENSION: Status: ACTIVE | Noted: 2025-02-23

## 2025-04-16 DIAGNOSIS — E78.2 MIXED HYPERLIPIDEMIA: ICD-10-CM

## 2025-04-16 RX ORDER — ATORVASTATIN CALCIUM 80 MG/1
80 TABLET, FILM COATED ORAL DAILY
Qty: 30 TABLET | Refills: 1 | Status: SHIPPED | OUTPATIENT
Start: 2025-04-16

## 2025-04-16 NOTE — TELEPHONE ENCOUNTER
Last visit:  02/19/25  Last Med refill: 03/31/25  Does patient have enough medication for 72 hours: Yes    Next Visit Date:  Future Appointments   Date Time Provider Department Center   5/20/2025  3:30 PM Nola Blackwell MD [facility]       Health Maintenance   Topic Date Due    Hepatitis B vaccine (1 of 3 - 19+ 3-dose series) Never done    Colorectal Cancer Screen  Never done    Flu vaccine (Season Ended) 01/17/2026 (Originally 8/1/2025)    Pneumococcal 0-49 years Vaccine (1 of 2 - PCV) 01/17/2026 (Originally 5/4/1998)    COVID-19 Vaccine (1 - 2024-25 season) 02/19/2026 (Originally 9/1/2024)    Depression Screen  01/17/2026    A1C test (Diabetic or Prediabetic)  01/27/2026    Lipids  01/27/2026    DTaP/Tdap/Td vaccine (2 - Td or Tdap) 12/16/2028    Hepatitis A vaccine  Aged Out    Hib vaccine  Aged Out    HPV vaccine  Aged Out    Polio vaccine  Aged Out    Meningococcal (ACWY) vaccine  Aged Out    Meningococcal B vaccine  Aged Out    Diabetes screen  Discontinued    Hepatitis C screen  Discontinued    HIV screen  Discontinued       Hemoglobin A1C (%)   Date Value   01/27/2025 5.7             ( goal A1C is < 7)   No components found for: \"LABMICR\"  No components found for: \"LDLCHOLESTEROL\", \"LDLCALC\"    (goal LDL is <100)   AST (U/L)   Date Value   09/04/2019 23     ALT (U/L)   Date Value   09/04/2019 28     BUN (mg/dL)   Date Value   01/27/2025 13     BP Readings from Last 3 Encounters:   02/19/25 124/80   01/17/25 110/76   08/18/24 121/79          (goal 120/80)    All Future Testing planned in [facility]  Lab Frequency Next Occurrence               Patient Active Problem List:     Chronic back pain     Urethral stricture     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

## 2025-05-20 ENCOUNTER — OFFICE VISIT (OUTPATIENT)
Dept: FAMILY MEDICINE CLINIC | Age: 45
End: 2025-05-20

## 2025-05-20 VITALS
WEIGHT: 315 LBS | OXYGEN SATURATION: 98 % | SYSTOLIC BLOOD PRESSURE: 118 MMHG | BODY MASS INDEX: 43.62 KG/M2 | DIASTOLIC BLOOD PRESSURE: 78 MMHG | HEART RATE: 68 BPM

## 2025-05-20 DIAGNOSIS — M54.42 CHRONIC BILATERAL LOW BACK PAIN WITH BILATERAL SCIATICA: Primary | ICD-10-CM

## 2025-05-20 DIAGNOSIS — M54.41 CHRONIC BILATERAL LOW BACK PAIN WITH BILATERAL SCIATICA: Primary | ICD-10-CM

## 2025-05-20 DIAGNOSIS — L60.0 INGROWN NAIL OF GREAT TOE: ICD-10-CM

## 2025-05-20 DIAGNOSIS — G89.29 CHRONIC BILATERAL LOW BACK PAIN WITH BILATERAL SCIATICA: Primary | ICD-10-CM

## 2025-05-20 DIAGNOSIS — I10 ESSENTIAL HYPERTENSION: ICD-10-CM

## 2025-05-20 DIAGNOSIS — R06.83 SNORING: ICD-10-CM

## 2025-05-20 PROBLEM — G51.0 BELL'S PALSY: Status: RESOLVED | Noted: 2018-12-11 | Resolved: 2025-05-20

## 2025-05-20 RX ORDER — CELECOXIB 100 MG/1
100 CAPSULE ORAL 2 TIMES DAILY
Qty: 180 CAPSULE | Refills: 0 | Status: SHIPPED | OUTPATIENT
Start: 2025-05-20

## 2025-05-20 RX ORDER — LISINOPRIL 20 MG/1
20 TABLET ORAL DAILY
Qty: 90 TABLET | Refills: 1 | Status: SHIPPED | OUTPATIENT
Start: 2025-05-20

## 2025-05-20 RX ORDER — HYDROCHLOROTHIAZIDE 12.5 MG/1
12.5 TABLET ORAL DAILY
COMMUNITY
Start: 2025-04-29
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
## 2025-06-05 ENCOUNTER — OFFICE VISIT (OUTPATIENT)
Age: 45
End: 2025-06-05
Payer: COMMERCIAL

## 2025-06-05 VITALS — BODY MASS INDEX: 41.75 KG/M2 | WEIGHT: 315 LBS | HEIGHT: 73 IN

## 2025-06-05 DIAGNOSIS — L60.0 ONYCHOCRYPTOSIS: Primary | ICD-10-CM

## 2025-06-05 DIAGNOSIS — M79.674 PAIN IN TOE OF RIGHT FOOT: ICD-10-CM

## 2025-06-05 DIAGNOSIS — M79.675 PAIN IN TOE OF LEFT FOOT: ICD-10-CM

## 2025-06-05 PROCEDURE — 99203 OFFICE O/P NEW LOW 30 MIN: CPT | Performed by: PODIATRIST

## 2025-06-05 ASSESSMENT — ENCOUNTER SYMPTOMS
COLOR CHANGE: 0
NAUSEA: 0
SHORTNESS OF BREATH: 0
DIARRHEA: 0
BACK PAIN: 0

## 2025-06-05 NOTE — PROGRESS NOTES
Senthil Bartholomew is a 45 y.o. male who presents to the office today with chief complaint of ingrown nails to both big toenails.  Chief Complaint   Patient presents with    Nail Problem     B/l hallux    Symptoms began about 15 year(s) ago. Patient denies injury to the feet. Patient states that he has always tried to clip the nails to keep them under control, but he is tired of dealing with it. Pain is rated 10 out of 10 at it's worst and is described as intermittent. Treatments prior to today's visit include: None.      No Known Allergies    Past Medical History:   Diagnosis Date    1st degree AV block     Back pain     Bell's palsy 12/11/2018    Bladder disorder, other     Childhood asthma     H/O gastroesophageal reflux (GERD)     mild    Lumbar disc disease     Morbid obesity (HCC)     Snores        Prior to Admission medications    Medication Sig Start Date End Date Taking? Authorizing Provider   hydroCHLOROthiazide 12.5 MG tablet Take 1 tablet by mouth daily 4/29/25  Yes Vishnu Funes MD   celecoxib (CELEBREX) 100 MG capsule Take 1 capsule by mouth 2 times daily 5/20/25  Yes Nola Blackwell MD   Elastic Bandages & Supports (LUMBAR BACK BRACE/SUPPORT PAD) MISC 1 each by Does not apply route daily as needed (for lower back pain) 5/20/25  Yes Nola Blackwell MD   lisinopril (PRINIVIL;ZESTRIL) 20 MG tablet Take 1 tablet by mouth daily 5/20/25  Yes Nola Blackwell MD   atorvastatin (LIPITOR) 80 MG tablet TAKE 1 TABLET BY MOUTH ONCE DAILY 4/16/25  Yes Nola Blackwell MD   Handicap Placard MISC by Does not apply route 2/19/25  Yes Nola Blackwell MD   metFORMIN (GLUCOPHAGE) 500 MG tablet Take 1 tablet by mouth daily (with breakfast) 2/19/25  Yes Nola Blackwell MD   docusate sodium (COLACE) 250 MG capsule Take 1 capsule by mouth 2 times daily as needed for Constipation 1/17/25  Yes Nola Blackwell MD   vitamin D (CHOLECALCIFEROL) 50 MCG (2000 UT) TABS tablet  3/1/21  Yes Vishnu Funes MD   metoprolol succinate

## 2025-07-16 RX ORDER — HYDROCHLOROTHIAZIDE 12.5 MG/1
12.5 TABLET ORAL DAILY
Qty: 30 TABLET | Refills: 1 | Status: SHIPPED | OUTPATIENT
Start: 2025-07-16

## 2025-07-16 NOTE — TELEPHONE ENCOUNTER
Last visit: 5/20/2025  Last Med refill: 04.29.2025  Does patient have enough medication for 72 hours: Unsure     Next Visit Date:  Future Appointments   Date Time Provider Department Center   7/31/2025  4:15 PM Nola Blackwell MD [facility]   8/7/2025  3:30 PM Maksim Mina, DPM [id number]       Health Maintenance   Topic Date Due    Hepatitis B vaccine (1 of 3 - 19+ 3-dose series) Never done    Colorectal Cancer Screen  Never done    Pneumococcal 0-49 years Vaccine (1 of 2 - PCV) 01/17/2026 (Originally 5/4/1998)    COVID-19 Vaccine (1 - 2024-25 season) 02/19/2026 (Originally 9/1/2024)    Flu vaccine (1) 08/01/2025    Depression Screen  01/17/2026    A1C test (Diabetic or Prediabetic)  01/27/2026    Lipids  01/27/2026    DTaP/Tdap/Td vaccine (2 - Td or Tdap) 12/16/2028    Hepatitis A vaccine  Aged Out    Hib vaccine  Aged Out    HPV vaccine  Aged Out    Polio vaccine  Aged Out    Meningococcal (ACWY) vaccine  Aged Out    Meningococcal B vaccine  Aged Out    Diabetes screen  Discontinued    Hepatitis C screen  Discontinued    HIV screen  Discontinued       Hemoglobin A1C (%)   Date Value   01/27/2025 5.7             ( goal A1C is < 7)   No components found for: \"LABMICR\"  No components found for: \"LDLCHOLESTEROL\", \"LDLCALC\"    (goal LDL is <100)   AST (U/L)   Date Value   09/04/2019 23     ALT (U/L)   Date Value   09/04/2019 28     BUN (mg/dL)   Date Value   01/27/2025 13     BP Readings from Last 3 Encounters:   05/20/25 118/78   02/19/25 124/80   01/17/25 110/76          (goal 120/80)    All Future Testing planned in [facility]  Lab Frequency Next Occurrence   Home Sleep Study Once 05/20/2025               Patient Active Problem List:     Chronic back pain     Urethral stricture     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

## 2025-08-07 ENCOUNTER — OFFICE VISIT (OUTPATIENT)
Age: 45
End: 2025-08-07
Payer: COMMERCIAL

## 2025-08-07 VITALS — HEIGHT: 73 IN | WEIGHT: 315 LBS | BODY MASS INDEX: 41.75 KG/M2

## 2025-08-07 DIAGNOSIS — M79.675 PAIN IN TOE OF LEFT FOOT: ICD-10-CM

## 2025-08-07 DIAGNOSIS — M79.674 PAIN IN TOE OF RIGHT FOOT: ICD-10-CM

## 2025-08-07 DIAGNOSIS — L60.0 ONYCHOCRYPTOSIS: Primary | ICD-10-CM

## 2025-08-07 PROCEDURE — 99213 OFFICE O/P EST LOW 20 MIN: CPT | Performed by: PODIATRIST

## 2025-08-07 PROCEDURE — 11750 EXCISION NAIL&NAIL MATRIX: CPT | Performed by: PODIATRIST

## 2025-08-11 ASSESSMENT — ENCOUNTER SYMPTOMS
BACK PAIN: 0
COLOR CHANGE: 0
SHORTNESS OF BREATH: 0
NAUSEA: 0
DIARRHEA: 0

## 2025-08-13 ENCOUNTER — HOSPITAL ENCOUNTER (OUTPATIENT)
Age: 45
Setting detail: SPECIMEN
Discharge: HOME | End: 2025-08-13

## 2025-08-13 ENCOUNTER — OFFICE VISIT (OUTPATIENT)
Dept: FAMILY MEDICINE CLINIC | Age: 45
End: 2025-08-13
Payer: COMMERCIAL

## 2025-08-13 VITALS
OXYGEN SATURATION: 98 % | TEMPERATURE: 98.2 F | HEART RATE: 63 BPM | SYSTOLIC BLOOD PRESSURE: 122 MMHG | BODY MASS INDEX: 43.67 KG/M2 | WEIGHT: 315 LBS | DIASTOLIC BLOOD PRESSURE: 80 MMHG

## 2025-08-13 DIAGNOSIS — I10 ESSENTIAL HYPERTENSION: Primary | ICD-10-CM

## 2025-08-13 DIAGNOSIS — Z12.11 COLON CANCER SCREENING: ICD-10-CM

## 2025-08-13 DIAGNOSIS — G89.29 CHRONIC MIDLINE LOW BACK PAIN WITHOUT SCIATICA: ICD-10-CM

## 2025-08-13 DIAGNOSIS — M54.50 CHRONIC MIDLINE LOW BACK PAIN WITHOUT SCIATICA: ICD-10-CM

## 2025-08-13 DIAGNOSIS — E78.2 MIXED HYPERLIPIDEMIA: ICD-10-CM

## 2025-08-13 DIAGNOSIS — Z11.3 ROUTINE SCREENING FOR STI (SEXUALLY TRANSMITTED INFECTION): ICD-10-CM

## 2025-08-13 PROCEDURE — 3079F DIAST BP 80-89 MM HG: CPT | Performed by: STUDENT IN AN ORGANIZED HEALTH CARE EDUCATION/TRAINING PROGRAM

## 2025-08-13 PROCEDURE — 3074F SYST BP LT 130 MM HG: CPT | Performed by: STUDENT IN AN ORGANIZED HEALTH CARE EDUCATION/TRAINING PROGRAM

## 2025-08-13 PROCEDURE — 99214 OFFICE O/P EST MOD 30 MIN: CPT | Performed by: STUDENT IN AN ORGANIZED HEALTH CARE EDUCATION/TRAINING PROGRAM

## 2025-08-13 RX ORDER — ATORVASTATIN CALCIUM 80 MG/1
80 TABLET, FILM COATED ORAL DAILY
Qty: 90 TABLET | Refills: 1 | Status: SHIPPED | OUTPATIENT
Start: 2025-08-13

## 2025-08-14 ENCOUNTER — TELEPHONE (OUTPATIENT)
Dept: FAMILY MEDICINE CLINIC | Age: 45
End: 2025-08-14

## 2025-08-14 LAB
CHLAMYDIA DNA UR QL NAA+PROBE: NEGATIVE
N GONORRHOEA DNA UR QL NAA+PROBE: NEGATIVE
SOURCE: NORMAL
SPECIMEN DESCRIPTION: NORMAL
TRICHOMONAS VAGINALIS, MOLECULAR: NEGATIVE

## 2025-08-19 PROBLEM — Z12.11 ENCOUNTER FOR SCREENING COLONOSCOPY: Status: ACTIVE | Noted: 2025-08-19

## 2025-08-19 RX ORDER — SODIUM, POTASSIUM,MAG SULFATES 17.5-3.13G
1 SOLUTION, RECONSTITUTED, ORAL ORAL ONCE
Qty: 1 EACH | Refills: 0 | Status: SHIPPED | OUTPATIENT
Start: 2025-08-19 | End: 2025-08-19

## 2025-08-19 RX ORDER — BISACODYL 5 MG
TABLET, DELAYED RELEASE (ENTERIC COATED) ORAL
Qty: 4 TABLET | Refills: 0 | Status: SHIPPED | OUTPATIENT
Start: 2025-08-19